# Patient Record
Sex: MALE | Race: AMERICAN INDIAN OR ALASKA NATIVE
[De-identification: names, ages, dates, MRNs, and addresses within clinical notes are randomized per-mention and may not be internally consistent; named-entity substitution may affect disease eponyms.]

---

## 2018-08-17 ENCOUNTER — HOSPITAL ENCOUNTER (EMERGENCY)
Dept: HOSPITAL 5 - ED | Age: 23
Discharge: HOME | End: 2018-08-17
Payer: SELF-PAY

## 2018-08-17 VITALS — SYSTOLIC BLOOD PRESSURE: 124 MMHG | DIASTOLIC BLOOD PRESSURE: 73 MMHG

## 2018-08-17 DIAGNOSIS — Z79.899: ICD-10-CM

## 2018-08-17 DIAGNOSIS — R11.2: Primary | ICD-10-CM

## 2018-08-17 LAB
ALBUMIN SERPL-MCNC: 4.6 G/DL (ref 3.9–5)
ALT SERPL-CCNC: 13 UNITS/L (ref 7–56)
BASOPHILS # (AUTO): 0 K/MM3 (ref 0–0.1)
BASOPHILS NFR BLD AUTO: 0.5 % (ref 0–1.8)
BENZODIAZEPINES SCREEN,URINE: (no result)
BILIRUB UR QL STRIP: (no result)
BLOOD UR QL VISUAL: (no result)
BUN SERPL-MCNC: 19 MG/DL (ref 9–20)
BUN/CREAT SERPL: 19 %
CALCIUM SERPL-MCNC: 9.8 MG/DL (ref 8.4–10.2)
EOSINOPHIL # BLD AUTO: 0 K/MM3 (ref 0–0.4)
EOSINOPHIL NFR BLD AUTO: 0.1 % (ref 0–4.3)
HCT VFR BLD CALC: 40.8 % (ref 35.5–45.6)
HEMOLYSIS INDEX: 24
HGB BLD-MCNC: 13.6 GM/DL (ref 11.8–15.2)
LYMPHOCYTES # BLD AUTO: 2 K/MM3 (ref 1.2–5.4)
LYMPHOCYTES NFR BLD AUTO: 30.3 % (ref 13.4–35)
MCH RBC QN AUTO: 30 PG (ref 28–32)
MCHC RBC AUTO-ENTMCNC: 34 % (ref 32–34)
MCV RBC AUTO: 90 FL (ref 84–94)
METHADONE SCREEN,URINE: (no result)
MONOCYTES # (AUTO): 1 K/MM3 (ref 0–0.8)
MONOCYTES % (AUTO): 14.3 % (ref 0–7.3)
MUCOUS THREADS #/AREA URNS HPF: (no result) /HPF
OPIATE SCREEN,URINE: (no result)
PH UR STRIP: 6 [PH] (ref 5–7)
PLATELET # BLD: 283 K/MM3 (ref 140–440)
RBC # BLD AUTO: 4.51 M/MM3 (ref 3.65–5.03)
RBC #/AREA URNS HPF: 3 /HPF (ref 0–6)
UROBILINOGEN UR-MCNC: 2 MG/DL (ref ?–2)
WBC #/AREA URNS HPF: 1 /HPF (ref 0–6)

## 2018-08-17 PROCEDURE — G0480 DRUG TEST DEF 1-7 CLASSES: HCPCS

## 2018-08-17 PROCEDURE — 96361 HYDRATE IV INFUSION ADD-ON: CPT

## 2018-08-17 PROCEDURE — 96374 THER/PROPH/DIAG INJ IV PUSH: CPT

## 2018-08-17 PROCEDURE — 96375 TX/PRO/DX INJ NEW DRUG ADDON: CPT

## 2018-08-17 PROCEDURE — 99283 EMERGENCY DEPT VISIT LOW MDM: CPT

## 2018-08-17 PROCEDURE — 81001 URINALYSIS AUTO W/SCOPE: CPT

## 2018-08-17 PROCEDURE — 80307 DRUG TEST PRSMV CHEM ANLYZR: CPT

## 2018-08-17 PROCEDURE — 85025 COMPLETE CBC W/AUTO DIFF WBC: CPT

## 2018-08-17 PROCEDURE — 80053 COMPREHEN METABOLIC PANEL: CPT

## 2018-08-17 PROCEDURE — 36415 COLL VENOUS BLD VENIPUNCTURE: CPT

## 2018-08-17 PROCEDURE — 80320 DRUG SCREEN QUANTALCOHOLS: CPT

## 2018-08-17 NOTE — EMERGENCY DEPARTMENT REPORT
Vomiting/Diarrhea





- [facility]


Chief Complaint: Nausea/Vomiting/Diarrhea


Stated Complaint: POSS ALCOHOL POISONING/VOMITING


Time Seen by Provider: 08/17/18 19:25


Duration: 3 Days


Severity: moderate


Nausea/Vomiting Severity: Moderate


Diarrhea Severity: None


Other History: 23-year-old -American male presents to the emergency room 

for complaint of nausea and vomiting and headache feels is related to alcohol 

ingestion.  Patient reports on Tuesday he had 3 drinks tequila and 1 rum since 

then he's had reported bloody vomitus and vomiting since Tuesday body aches and 

headache.





ED Review of Systems


ROS: 


Stated complaint: POSS ALCOHOL POISONING/VOMITING


Other details as noted in HPI








ED Past Medical Hx





- Past Medical History


Previous Medical History?: No


Hx Kidney Stones: No





- Surgical History


Past Surgical History?: No





- Social History


Smoking Status: Never Smoker


Substance Use Type: Alcohol





Vomiting Diarrhea Exam





- Exam


General: 


Vital signs noted. No distress. Alert and acting appropriately.





Neurologic: 


Alert and oriented, no deficits.








Musculoskeletal: 


Unremarkable.











ED Course


 Vital Signs











  08/17/18





  15:39


 


Temperature 99 F


 


Pulse Rate 92 H


 


Respiratory 16





Rate 


 


Blood Pressure 120/77


 


O2 Sat by Pulse 96





Oximetry 














- Reevaluation(s)


Reevaluation #1: 





08/17/18 22:58


Patient has completed 1 L of fluids and reports he feels much better now





ED Medical Decision Making





- Lab Data


Result diagrams: 


 08/17/18 19:44





 08/17/18 19:44


Critical care attestation.: 


If time is entered above; I have spent that time in minutes in the direct care 

of this critically ill patient, excluding procedure time.








ED Disposition


Clinical Impression: 


Nausea and vomiting


Qualifiers:


 Vomiting type: unspecified Vomiting Intractability: intractable Qualified Code(

s): R11.2 - Nausea with vomiting, unspecified





Disposition: DC-01 TO HOME OR SELFCARE


Is pt being admited?: No


Does the pt Need Aspirin: No


Condition: Stable


Instructions:  Gastritis (ED)


Additional Instructions: 


Please continue to drink plenty of fluids such as water and advance her diet as 

tolerated.  If your symptoms persist or gets worse please follow-up with the 

primary care provider.


Referrals: 


PR[facility] CARE,MD [Primary Care Provider] - 3-5 Days


[facility] [Provider Group] - 3-5 Days


Forms:  Work/School Release Form(ED), Accompanied Note

## 2018-09-10 ENCOUNTER — HOSPITAL ENCOUNTER (INPATIENT)
Dept: HOSPITAL 5 - ED | Age: 23
LOS: 4 days | Discharge: HOME | DRG: 393 | End: 2018-09-14
Attending: INTERNAL MEDICINE | Admitting: INTERNAL MEDICINE
Payer: SELF-PAY

## 2018-09-10 DIAGNOSIS — Z82.49: ICD-10-CM

## 2018-09-10 DIAGNOSIS — B20: ICD-10-CM

## 2018-09-10 DIAGNOSIS — R65.10: ICD-10-CM

## 2018-09-10 DIAGNOSIS — K38.8: ICD-10-CM

## 2018-09-10 DIAGNOSIS — K60.2: Primary | ICD-10-CM

## 2018-09-10 LAB
ALBUMIN SERPL-MCNC: 4.4 G/DL (ref 3.9–5)
ALT SERPL-CCNC: 13 UNITS/L (ref 7–56)
APTT BLD: 35.1 SEC. (ref 24.2–36.6)
BASOPHILS # (AUTO): 0 K/MM3 (ref 0–0.1)
BASOPHILS NFR BLD AUTO: 0.1 % (ref 0–1.8)
BILIRUB UR QL STRIP: (no result)
BLOOD UR QL VISUAL: (no result)
BUN SERPL-MCNC: 9 MG/DL (ref 9–20)
BUN/CREAT SERPL: 8 %
CALCIUM SERPL-MCNC: 9.5 MG/DL (ref 8.4–10.2)
EOSINOPHIL # BLD AUTO: 0 K/MM3 (ref 0–0.4)
EOSINOPHIL NFR BLD AUTO: 0 % (ref 0–4.3)
HCT VFR BLD CALC: 40.7 % (ref 35.5–45.6)
HEMOLYSIS INDEX: 3
HGB BLD-MCNC: 13.7 GM/DL (ref 11.8–15.2)
INR PPP: 1.15 (ref 0.87–1.13)
INR PPP: 1.15 (ref 0.87–1.13)
LIPASE SERPL-CCNC: 14 UNITS/L (ref 13–60)
LYMPHOCYTES # BLD AUTO: 2.2 K/MM3 (ref 1.2–5.4)
LYMPHOCYTES NFR BLD AUTO: 21.1 % (ref 13.4–35)
MCH RBC QN AUTO: 31 PG (ref 28–32)
MCHC RBC AUTO-ENTMCNC: 34 % (ref 32–34)
MCV RBC AUTO: 91 FL (ref 84–94)
MONOCYTES # (AUTO): 1.3 K/MM3 (ref 0–0.8)
MONOCYTES % (AUTO): 12.8 % (ref 0–7.3)
MUCOUS THREADS #/AREA URNS HPF: (no result) /HPF
PH UR STRIP: 5 [PH] (ref 5–7)
PLATELET # BLD: 278 K/MM3 (ref 140–440)
PROT UR STRIP-MCNC: (no result) MG/DL
RBC # BLD AUTO: 4.47 M/MM3 (ref 3.65–5.03)
RBC #/AREA URNS HPF: 50 /HPF (ref 0–6)
UROBILINOGEN UR-MCNC: < 2 MG/DL (ref ?–2)
WBC #/AREA URNS HPF: 2 /HPF (ref 0–6)

## 2018-09-10 PROCEDURE — 80048 BASIC METABOLIC PNL TOTAL CA: CPT

## 2018-09-10 PROCEDURE — 85014 HEMATOCRIT: CPT

## 2018-09-10 PROCEDURE — 36415 COLL VENOUS BLD VENIPUNCTURE: CPT

## 2018-09-10 PROCEDURE — 87040 BLOOD CULTURE FOR BACTERIA: CPT

## 2018-09-10 PROCEDURE — 74177 CT ABD & PELVIS W/CONTRAST: CPT

## 2018-09-10 PROCEDURE — 85018 HEMOGLOBIN: CPT

## 2018-09-10 PROCEDURE — 87086 URINE CULTURE/COLONY COUNT: CPT

## 2018-09-10 PROCEDURE — 86900 BLOOD TYPING SEROLOGIC ABO: CPT

## 2018-09-10 PROCEDURE — 83735 ASSAY OF MAGNESIUM: CPT

## 2018-09-10 PROCEDURE — 80053 COMPREHEN METABOLIC PANEL: CPT

## 2018-09-10 PROCEDURE — 72195 MRI PELVIS W/O DYE: CPT

## 2018-09-10 PROCEDURE — 85025 COMPLETE CBC W/AUTO DIFF WBC: CPT

## 2018-09-10 PROCEDURE — 96375 TX/PRO/DX INJ NEW DRUG ADDON: CPT

## 2018-09-10 PROCEDURE — 82805 BLOOD GASES W/O2 SATURATION: CPT

## 2018-09-10 PROCEDURE — 87430 STREP A AG IA: CPT

## 2018-09-10 PROCEDURE — 86695 HERPES SIMPLEX TYPE 1 TEST: CPT

## 2018-09-10 PROCEDURE — 93005 ELECTROCARDIOGRAM TRACING: CPT

## 2018-09-10 PROCEDURE — 86696 HERPES SIMPLEX TYPE 2 TEST: CPT

## 2018-09-10 PROCEDURE — 86140 C-REACTIVE PROTEIN: CPT

## 2018-09-10 PROCEDURE — 83690 ASSAY OF LIPASE: CPT

## 2018-09-10 PROCEDURE — 87591 N.GONORRHOEAE DNA AMP PROB: CPT

## 2018-09-10 PROCEDURE — 82140 ASSAY OF AMMONIA: CPT

## 2018-09-10 PROCEDURE — 86850 RBC ANTIBODY SCREEN: CPT

## 2018-09-10 PROCEDURE — 96365 THER/PROPH/DIAG IV INF INIT: CPT

## 2018-09-10 PROCEDURE — 85007 BL SMEAR W/DIFF WBC COUNT: CPT

## 2018-09-10 PROCEDURE — 86592 SYPHILIS TEST NON-TREP QUAL: CPT

## 2018-09-10 PROCEDURE — 85730 THROMBOPLASTIN TIME PARTIAL: CPT

## 2018-09-10 PROCEDURE — 93010 ELECTROCARDIOGRAM REPORT: CPT

## 2018-09-10 PROCEDURE — 96361 HYDRATE IV INFUSION ADD-ON: CPT

## 2018-09-10 PROCEDURE — 87116 MYCOBACTERIA CULTURE: CPT

## 2018-09-10 PROCEDURE — 71045 X-RAY EXAM CHEST 1 VIEW: CPT

## 2018-09-10 PROCEDURE — 86901 BLOOD TYPING SEROLOGIC RH(D): CPT

## 2018-09-10 PROCEDURE — 87400 INFLUENZA A/B EACH AG IA: CPT

## 2018-09-10 PROCEDURE — 85610 PROTHROMBIN TIME: CPT

## 2018-09-10 PROCEDURE — 81001 URINALYSIS AUTO W/SCOPE: CPT

## 2018-09-10 PROCEDURE — 82271 OCCULT BLOOD OTHER SOURCES: CPT

## 2018-09-10 NOTE — XRAY REPORT
FINAL REPORT



EXAM:  XR CHEST 1V AP



HISTORY:  sepsis 



TECHNIQUE:  AP portable view of the chest



PRIORS:  None.



FINDINGS:  

Lines, tubes, and devices:  N/A



Lungs and pleura: Trachea is normal in position. Lungs are clear

of infiltrate, pleural effusion, vascular congestion, or

pneumothorax. 



Cardiomediastinal silhouette: Cardiac and mediastinal silhouettes

are unremarkable.



Other: Bony structures are intact.



IMPRESSION:  

No acute cardiopulmonary process seen.

## 2018-09-10 NOTE — EMERGENCY DEPARTMENT REPORT
ED GI Bleed HPI





- General


Chief complaint: GI Bleed


Stated complaint: BODY PAINS/BLOOD IN STOOL


Time Seen by Provider: 09/10/18 16:59


Source: patient


Mode of arrival: Ambulatory


Limitations: No Limitations





- History of Present Illness


Initial comments: 





23-year-old male with a past medical history of HIV presents to Hospital 

complaining of rectal pain and blood on his tissue when he wipes after bowel 

movements for the past one week.  Last night he developed generalized body 

aches and chills and presents to the ER with 101.1 fever.  Patient states his 

CD4 count 2 months ago was 690.  He is taking HIV medication.  He engages in 

receptive anal intercourse was last intercourse about one week ago.  He 

complains of sore throat.  He denies cough, chest pain, shortness of breath, 

abdominal pain, nausea, vomiting, diarrhea, or melena.  Patient received 

Tylenol prior to my evaluation


Severity scale (0 -10): 6





- Related Data


 Allergies











Allergy/AdvReac Type Severity Reaction Status Date / Time


 


No Known Allergies Allergy   Verified 09/10/18 14:53














ED Review of Systems


ROS: 


Stated complaint: BODY PAINS/BLOOD IN STOOL


Other details as noted in HPI





Comment: All other systems reviewed and negative





ED Past Medical Hx





- Past Medical History


Hx Kidney Stones: No


Additional medical history: HIV





- Surgical History


Past Surgical History?: No





- Social History


Smoking Status: Never Smoker


Substance Use Type: Alcohol





ED Physical Exam





- General


Limitations: No Limitations





- Other


Other exam information: 





General: No limitations, patient is alert in no acute distress


Head exam: Atraumatic, normocephalic


Eyes exam: Normal appearance


ENT: Moist mucous membrane, normal oropharynx, no exudates, no thrush


Neck exam: Normal inspection, full range of motion, no meningismus nontender


Respiratory exam: Clear to auscultation bilateral, no wheezes, rales, crackles


Cardiovascular: Normal rate and rhythm, normal heart sounds


Abdomen: Soft, nondistended, left lower quadrant tenderness, with normal bowel 

sounds, no rebound, or guarding


Rectal: Anal skin tenderness versus wart.  Guaiac positive brown stool without 

gross blood or melena.  No fluctuance and rectal.  Or visible abscess


Extremity: Full range of motion normal inspection no deformity


Back: Normal Inspection, full range of motion, no tenderness


Neurologic: Alert, oriented x3, cranial nerves intact, no motor or sensory 

deficit


Psychiatric: normal affect, normal mood


Skin: Warm, dry, intact





ED Course


 Vital Signs











  09/10/18 09/10/18 09/10/18





  14:54 16:01 16:56


 


Temperature 101.1 F H  


 


Pulse Rate 116 H  


 


Respiratory 18 16 





Rate   


 


Blood Pressure 109/73  


 


Blood Pressure   





[Right]   


 


O2 Sat by Pulse 99  99





Oximetry   














  09/10/18 09/10/18 09/10/18





  17:00 17:09 18:30


 


Temperature   


 


Pulse Rate 104 H  


 


Respiratory 19 16 16





Rate   


 


Blood Pressure 110/78  


 


Blood Pressure   





[Right]   


 


O2 Sat by Pulse 99 100 





Oximetry   














  09/10/18 09/10/18 09/10/18





  19:26 20:00 21:00


 


Temperature 98.2 F  


 


Pulse Rate 111 H 99 H 102 H


 


Respiratory 17 15 19





Rate   


 


Blood Pressure  107/75 109/69


 


Blood Pressure 105/67  





[Right]   


 


O2 Sat by Pulse 97  100





Oximetry   














- Consultations


Consultation #1: 





09/10/18 22:05


Case discussed with Dr. Xavier general surgeon on call.  Will see patient in 

consult and recommends admission to the hospitalist.





ED Medical Decision Making





- Lab Data


Result diagrams: 


 09/10/18 15:07





 09/10/18 15:07








 Lab Results











  09/10/18 09/10/18 09/10/18 Range/Units





  15:07 15:07 15:07 


 


WBC  10.2    (4.5-11.0)  K/mm3


 


RBC  4.47    (3.65-5.03)  M/mm3


 


Hgb  13.7    (11.8-15.2)  gm/dl


 


Hct  40.7    (35.5-45.6)  %


 


MCV  91    (84-94)  fl


 


MCH  31    (28-32)  pg


 


MCHC  34    (32-34)  %


 


RDW  15.5 H    (13.2-15.2)  %


 


Plt Count  278    (140-440)  K/mm3


 


Lymph % (Auto)  21.1    (13.4-35.0)  %


 


Mono % (Auto)  12.8 H    (0.0-7.3)  %


 


Eos % (Auto)  0.0    (0.0-4.3)  %


 


Baso % (Auto)  0.1    (0.0-1.8)  %


 


Lymph #  2.2    (1.2-5.4)  K/mm3


 


Mono #  1.3 H    (0.0-0.8)  K/mm3


 


Eos #  0.0    (0.0-0.4)  K/mm3


 


Baso #  0.0    (0.0-0.1)  K/mm3


 


Seg Neutrophils %  66.0    (40.0-70.0)  %


 


Seg Neutrophils #  6.8    (1.8-7.7)  K/mm3


 


PT   15.3 H   (12.2-14.9)  Sec.


 


INR   1.15 H   (0.87-1.13)  


 


APTT   35.1   (24.2-36.6)  Sec.


 


VBG pH     (7.320-7.420)  


 


Sodium    137  (137-145)  mmol/L


 


Potassium    4.5  (3.6-5.0)  mmol/L


 


Chloride    99.7  ()  mmol/L


 


Carbon Dioxide    26  (22-30)  mmol/L


 


Anion Gap    16  mmol/L


 


BUN    9  (9-20)  mg/dL


 


Creatinine    1.1  (0.8-1.5)  mg/dL


 


Estimated GFR    > 60  ml/min


 


BUN/Creatinine Ratio    8  %


 


Glucose    104 H  ()  mg/dL


 


Lactic Acid     (0.7-2.0)  mmol/L


 


Calcium    9.5  (8.4-10.2)  mg/dL


 


Magnesium     (1.7-2.3)  mg/dL


 


Total Bilirubin    0.60  (0.1-1.2)  mg/dL


 


AST    24  (5-40)  units/L


 


ALT    13  (7-56)  units/L


 


Alkaline Phosphatase    75  ()  units/L


 


Total Protein    9.2 H  (6.3-8.2)  g/dL


 


Albumin    4.4  (3.9-5)  g/dL


 


Albumin/Globulin Ratio    0.9  %


 


Lipase    14  (13-60)  units/L


 


Urine Color     (Yellow)  


 


Urine Turbidity     (Clear)  


 


Urine pH     (5.0-7.0)  


 


Ur Specific Gravity     (1.003-1.030)  


 


Urine Protein     (Negative)  mg/dL


 


Urine Glucose (UA)     (Negative)  mg/dL


 


Urine Ketones     (Negative)  mg/dL


 


Urine Blood     (Negative)  


 


Urine Nitrite     (Negative)  


 


Urine Bilirubin     (Negative)  


 


Urine Urobilinogen     (<2.0)  mg/dL


 


Ur Leukocyte Esterase     (Negative)  


 


Urine WBC (Auto)     (0.0-6.0)  /HPF


 


Urine RBC (Auto)     (0.0-6.0)  /HPF


 


U Epithel Cells (Auto)     (0-13.0)  /HPF


 


Urine Mucus     /HPF


 


Blood Type     


 


Antibody Screen     














  09/10/18 09/10/18 09/10/18 Range/Units





  15:07 15:07 16:20 


 


WBC     (4.5-11.0)  K/mm3


 


RBC     (3.65-5.03)  M/mm3


 


Hgb     (11.8-15.2)  gm/dl


 


Hct     (35.5-45.6)  %


 


MCV     (84-94)  fl


 


MCH     (28-32)  pg


 


MCHC     (32-34)  %


 


RDW     (13.2-15.2)  %


 


Plt Count     (140-440)  K/mm3


 


Lymph % (Auto)     (13.4-35.0)  %


 


Mono % (Auto)     (0.0-7.3)  %


 


Eos % (Auto)     (0.0-4.3)  %


 


Baso % (Auto)     (0.0-1.8)  %


 


Lymph #     (1.2-5.4)  K/mm3


 


Mono #     (0.0-0.8)  K/mm3


 


Eos #     (0.0-0.4)  K/mm3


 


Baso #     (0.0-0.1)  K/mm3


 


Seg Neutrophils %     (40.0-70.0)  %


 


Seg Neutrophils #     (1.8-7.7)  K/mm3


 


PT     (12.2-14.9)  Sec.


 


INR     (0.87-1.13)  


 


APTT     (24.2-36.6)  Sec.


 


VBG pH     (7.320-7.420)  


 


Sodium     (137-145)  mmol/L


 


Potassium     (3.6-5.0)  mmol/L


 


Chloride     ()  mmol/L


 


Carbon Dioxide     (22-30)  mmol/L


 


Anion Gap     mmol/L


 


BUN     (9-20)  mg/dL


 


Creatinine     (0.8-1.5)  mg/dL


 


Estimated GFR     ml/min


 


BUN/Creatinine Ratio     %


 


Glucose     ()  mg/dL


 


Lactic Acid   1.00   (0.7-2.0)  mmol/L


 


Calcium     (8.4-10.2)  mg/dL


 


Magnesium    1.90  (1.7-2.3)  mg/dL


 


Total Bilirubin     (0.1-1.2)  mg/dL


 


AST     (5-40)  units/L


 


ALT     (7-56)  units/L


 


Alkaline Phosphatase     ()  units/L


 


Total Protein     (6.3-8.2)  g/dL


 


Albumin     (3.9-5)  g/dL


 


Albumin/Globulin Ratio     %


 


Lipase     (13-60)  units/L


 


Urine Color     (Yellow)  


 


Urine Turbidity     (Clear)  


 


Urine pH     (5.0-7.0)  


 


Ur Specific Gravity     (1.003-1.030)  


 


Urine Protein     (Negative)  mg/dL


 


Urine Glucose (UA)     (Negative)  mg/dL


 


Urine Ketones     (Negative)  mg/dL


 


Urine Blood     (Negative)  


 


Urine Nitrite     (Negative)  


 


Urine Bilirubin     (Negative)  


 


Urine Urobilinogen     (<2.0)  mg/dL


 


Ur Leukocyte Esterase     (Negative)  


 


Urine WBC (Auto)     (0.0-6.0)  /HPF


 


Urine RBC (Auto)     (0.0-6.0)  /HPF


 


U Epithel Cells (Auto)     (0-13.0)  /HPF


 


Urine Mucus     /HPF


 


Blood Type  O NEGATIVE    


 


Antibody Screen  Negative    














  09/10/18 09/10/18 09/10/18 Range/Units





  16:20 16:20 16:20 


 


WBC     (4.5-11.0)  K/mm3


 


RBC     (3.65-5.03)  M/mm3


 


Hgb     (11.8-15.2)  gm/dl


 


Hct     (35.5-45.6)  %


 


MCV     (84-94)  fl


 


MCH     (28-32)  pg


 


MCHC     (32-34)  %


 


RDW     (13.2-15.2)  %


 


Plt Count     (140-440)  K/mm3


 


Lymph % (Auto)     (13.4-35.0)  %


 


Mono % (Auto)     (0.0-7.3)  %


 


Eos % (Auto)     (0.0-4.3)  %


 


Baso % (Auto)     (0.0-1.8)  %


 


Lymph #     (1.2-5.4)  K/mm3


 


Mono #     (0.0-0.8)  K/mm3


 


Eos #     (0.0-0.4)  K/mm3


 


Baso #     (0.0-0.1)  K/mm3


 


Seg Neutrophils %     (40.0-70.0)  %


 


Seg Neutrophils #     (1.8-7.7)  K/mm3


 


PT  15.3 H    (12.2-14.9)  Sec.


 


INR  1.15 H    (0.87-1.13)  


 


APTT     (24.2-36.6)  Sec.


 


VBG pH    7.340  (7.320-7.420)  


 


Sodium     (137-145)  mmol/L


 


Potassium     (3.6-5.0)  mmol/L


 


Chloride     ()  mmol/L


 


Carbon Dioxide     (22-30)  mmol/L


 


Anion Gap     mmol/L


 


BUN     (9-20)  mg/dL


 


Creatinine     (0.8-1.5)  mg/dL


 


Estimated GFR     ml/min


 


BUN/Creatinine Ratio     %


 


Glucose     ()  mg/dL


 


Lactic Acid   1.20   (0.7-2.0)  mmol/L


 


Calcium     (8.4-10.2)  mg/dL


 


Magnesium     (1.7-2.3)  mg/dL


 


Total Bilirubin     (0.1-1.2)  mg/dL


 


AST     (5-40)  units/L


 


ALT     (7-56)  units/L


 


Alkaline Phosphatase     ()  units/L


 


Total Protein     (6.3-8.2)  g/dL


 


Albumin     (3.9-5)  g/dL


 


Albumin/Globulin Ratio     %


 


Lipase     (13-60)  units/L


 


Urine Color     (Yellow)  


 


Urine Turbidity     (Clear)  


 


Urine pH     (5.0-7.0)  


 


Ur Specific Gravity     (1.003-1.030)  


 


Urine Protein     (Negative)  mg/dL


 


Urine Glucose (UA)     (Negative)  mg/dL


 


Urine Ketones     (Negative)  mg/dL


 


Urine Blood     (Negative)  


 


Urine Nitrite     (Negative)  


 


Urine Bilirubin     (Negative)  


 


Urine Urobilinogen     (<2.0)  mg/dL


 


Ur Leukocyte Esterase     (Negative)  


 


Urine WBC (Auto)     (0.0-6.0)  /HPF


 


Urine RBC (Auto)     (0.0-6.0)  /HPF


 


U Epithel Cells (Auto)     (0-13.0)  /HPF


 


Urine Mucus     /HPF


 


Blood Type     


 


Antibody Screen     














  09/10/18 09/10/18 Range/Units





  19:45 20:02 


 


WBC    (4.5-11.0)  K/mm3


 


RBC    (3.65-5.03)  M/mm3


 


Hgb    (11.8-15.2)  gm/dl


 


Hct    (35.5-45.6)  %


 


MCV    (84-94)  fl


 


MCH    (28-32)  pg


 


MCHC    (32-34)  %


 


RDW    (13.2-15.2)  %


 


Plt Count    (140-440)  K/mm3


 


Lymph % (Auto)    (13.4-35.0)  %


 


Mono % (Auto)    (0.0-7.3)  %


 


Eos % (Auto)    (0.0-4.3)  %


 


Baso % (Auto)    (0.0-1.8)  %


 


Lymph #    (1.2-5.4)  K/mm3


 


Mono #    (0.0-0.8)  K/mm3


 


Eos #    (0.0-0.4)  K/mm3


 


Baso #    (0.0-0.1)  K/mm3


 


Seg Neutrophils %    (40.0-70.0)  %


 


Seg Neutrophils #    (1.8-7.7)  K/mm3


 


PT    (12.2-14.9)  Sec.


 


INR    (0.87-1.13)  


 


APTT    (24.2-36.6)  Sec.


 


VBG pH    (7.320-7.420)  


 


Sodium    (137-145)  mmol/L


 


Potassium    (3.6-5.0)  mmol/L


 


Chloride    ()  mmol/L


 


Carbon Dioxide    (22-30)  mmol/L


 


Anion Gap    mmol/L


 


BUN    (9-20)  mg/dL


 


Creatinine    (0.8-1.5)  mg/dL


 


Estimated GFR    ml/min


 


BUN/Creatinine Ratio    %


 


Glucose    ()  mg/dL


 


Lactic Acid   1.40  (0.7-2.0)  mmol/L


 


Calcium    (8.4-10.2)  mg/dL


 


Magnesium    (1.7-2.3)  mg/dL


 


Total Bilirubin    (0.1-1.2)  mg/dL


 


AST    (5-40)  units/L


 


ALT    (7-56)  units/L


 


Alkaline Phosphatase    ()  units/L


 


Total Protein    (6.3-8.2)  g/dL


 


Albumin    (3.9-5)  g/dL


 


Albumin/Globulin Ratio    %


 


Lipase    (13-60)  units/L


 


Urine Color  Yellow   (Yellow)  


 


Urine Turbidity  Clear   (Clear)  


 


Urine pH  5.0   (5.0-7.0)  


 


Ur Specific Gravity  1.026   (1.003-1.030)  


 


Urine Protein  <15 mg/dl   (Negative)  mg/dL


 


Urine Glucose (UA)  Neg   (Negative)  mg/dL


 


Urine Ketones  Neg   (Negative)  mg/dL


 


Urine Blood  Mod   (Negative)  


 


Urine Nitrite  Neg   (Negative)  


 


Urine Bilirubin  Neg   (Negative)  


 


Urine Urobilinogen  < 2.0   (<2.0)  mg/dL


 


Ur Leukocyte Esterase  Neg   (Negative)  


 


Urine WBC (Auto)  2.0   (0.0-6.0)  /HPF


 


Urine RBC (Auto)  50.0   (0.0-6.0)  /HPF


 


U Epithel Cells (Auto)  1.0   (0-13.0)  /HPF


 


Urine Mucus  1+   /HPF


 


Blood Type    


 


Antibody Screen    














- EKG Data


-: EKG Interpreted by Me


EKG shows normal: sinus rhythm, axis (qrs 24), QRS complexes (qrsd 83), ST-T 

waves (no stemi/t inv)


Rate: tachycardia (112)





- EKG Data


When compared to previous EKG there are: previous EKG unavailable





- Medical Decision Making





Rectal bleeding after bowel movement


No gross blood on rectal exam although guaiac positive


H&H normal





Fever with rectal pain


Empirically covered with Zosyn


30 ml/kg bolus of normal saline as per sepsis protocol


CT abdomen and pelvis: Borderline findings for acute appendicitis.  Patient 

reexamined after results obtained and persistently has left lower quadrant pain 

without right lower quadrant tenderness.


Case discussed with surgeon on call and patient will be admitted to hospitalist 

with surgical consultation and monitoring





UA shows moderate blood but no signs of infection





- Differential Diagnosis


rectal/perirectal abscess, diverticulitis, proctitis


Critical Care Time: No


Critical care attestation.: 


If time is entered above; I have spent that time in minutes in the direct care 

of this critically ill patient, excluding procedure time.








ED Disposition


Clinical Impression: 


 Fever, Rectal bleeding, Left lower quadrant pain, HIV positive





Disposition: DC-09 OP ADMIT IP TO THIS HOSP


Is pt being admited?: Yes


Condition: Stable


Time of Disposition: 22:08 (Dr. Lao/Hospitalist)

## 2018-09-10 NOTE — CAT SCAN REPORT
FINAL REPORT



EXAM:  CT ABDOMEN PELVIS W CON



HISTORY:  rectal pain, fever, LLQ pain 



TECHNIQUE:  Standard enhanced CT of the abdomen and pelvis.

Coronal and sagittal reconstruction was also performed. Delayed

imaging through the kidneys and bladder was also obtained. 



Contrast:  100 mL Omnipaque 300 given IV. Oral contrast was given



PRIORS:  None.



FINDINGS:  

The appendix measures 8 mm in diameter and has mild diffuse wall

thickening. It does not fill with oral contrast on delayed

imaging. The appendix dips down into the right side of the

pelvis. However, no surrounding inflammation is seen in the

adjacent fat. No evidence for abscess is seen. These findings are

borderline for acute appendicitis and should be correlated

clinically. (Axial image 67, series 4, coronal image 78). 



The rectum appears normal. 



Within the abdomen, the liver, spleen, pancreas, gallbladder,

adrenal glands, and kidneys are unremarkable. No evidence for

retroperitoneal or pelvic lymphadenopathy is seen. There is

moderate stool present throughout the entire colon. The bowel

loops have normal caliber. No soft tissue mass, fluid collection,

inflammatory change, or free air is seen within the abdomen or

pelvis. 



Within the pelvis, the bladder is unremarkable. The prostate is

normal. No evidence for mass or lymphadenopathy is seen in the

pelvis. 



Images through the upper abdomen include the lung bases which are

expanded and clear.



Bony structures show no focal abnormalities and are intact.



IMPRESSION:  

1. Normal appearance to the rectum. 



2. The appendix is mildly dilated in size with wall thickening

but no surrounding inflammation. It does not fill with oral

contrast. These findings can be consistent with acute

appendicitis. The appendix dips into the right pelvis.

## 2018-09-10 NOTE — HISTORY AND PHYSICAL REPORT
History of Present Illness


Date of examination: 09/10/18


History of present illness: 


32-year-old man with a history HIV, CD4 count of 690 g the emergency room with 

complaint of abdominal pain located in the left lower quadrant which she 

described as sharp, intermittent, lasting for a few seconds, intensity5/10, no 

radiation, he cannot identify exacerbating factor.  Admits to nausea no 

vomiting.  He's been having blood per rectum only with bowel movements, once a 

day over the last 1 week.  Today he complains of fever and chills.  Stated he 

had blood per rectum 5 years ago when he was diagnosed with HIV


Review of systems


Constitutional: no  weight loss, chills, fever


Ears, eyes, nose, mouth and throat: no nasal congestion, no nasal discharge, no 

sinus pressure, no vision change, no red eye.


Neck: No neck pain or rigidity.


Cardiovascular: no chest pain, palpitations


Respiratory: no cough, shortness of breath


Gastrointestinal: + abdominal pain hematochezia


Genitourinary : no  frequency , no hematuria


Musculoskeletal: no joint swelling or muscle ache 


Integumentary: no rash, no pruritis


Neurological: no parathesias, no numbness, no focal weakness


Endocrine: no cold or heat intolerance, no polyuria or polydipsia


Hematologic/Lymphatic: no easy bruising, no easy bleeding, no gland swelling


Allergic/Immunologic: no urticaria, no angioedema.





PAST MEDICAL HISTORY: HIV





PAST SURGICAL HISTORY: None





SOCIAL HISTORY: No alcohol, no drugs, tobacco





FAMILY HISTORY: Hypertension








Medications and Allergies


 Allergies











Allergy/AdvReac Type Severity Reaction Status Date / Time


 


No Known Allergies Allergy   Verified 09/10/18 14:53











 Home Medications











 Medication  Instructions  Recorded  Confirmed  Last Taken  Type


 


Emtricita/Rilpivirine/Tenof Df 1 each PO DAILY 09/10/18 09/10/18 Unknown History





[Complera Tablet]     











Active Meds: 


Active Medications





Piperacillin Sod/Tazobactam Sod (Zosyn/Ns 4.5gm/100ml)  4.5 gm in 100 mls @ 200 

mls/hr IV ONCE ELIAS


   Last Admin: 09/10/18 18:00 Dose:  200 mls/hr











Exam





- Physical Exam


Narrative exam: 


Gen. appearance: Patient lying in bed, no apparent distress


HEENT: Normocephalic, atraumatic, pupils equally round and reactive to light,  

extraocular movement intact, and no sclericterus,. No JVD or thyromegaly or 

nodule,neck supple, no carotid bruit ,mucous membranes moist, no exudate or 

erythema


Heart: S1, S2, regular rate and rhythm


Lungs: Clear bilaterally, breathing comfortable


Abdomen: Positive bowel sounds, tender left lower quadrant, nondistended, no 

organomegaly


Extremity:no edema cyanosis, clubbing


Skin:  no rash, dry, warm


Neuro: Oriented 3, cranial nerves II-12 intact, speech is fluent, motor and 

sensory intact


Rectal: Brown stool, heme positive











- Constitutional


Vitals: 


 











Temp Pulse Resp BP Pulse Ox


 


 98.2 F   102 H  19   109/69   100 


 


 09/10/18 19:26  09/10/18 21:00  09/10/18 21:00  09/10/18 21:00  09/10/18 21:00














Results





- Labs


CBC & Chem 7: 


 09/10/18 15:07





 09/10/18 15:07


Labs: 


 Abnormal lab results











  09/10/18 09/10/18 09/10/18 Range/Units





  15:07 15:07 15:07 


 


RDW  15.5 H    (13.2-15.2)  %


 


Mono % (Auto)  12.8 H    (0.0-7.3)  %


 


Mono #  1.3 H    (0.0-0.8)  K/mm3


 


PT   15.3 H   (12.2-14.9)  Sec.


 


INR   1.15 H   (0.87-1.13)  


 


Glucose    104 H  ()  mg/dL


 


Total Protein    9.2 H  (6.3-8.2)  g/dL














  09/10/18 Range/Units





  16:20 


 


RDW   (13.2-15.2)  %


 


Mono % (Auto)   (0.0-7.3)  %


 


Mono #   (0.0-0.8)  K/mm3


 


PT  15.3 H  (12.2-14.9)  Sec.


 


INR  1.15 H  (0.87-1.13)  


 


Glucose   ()  mg/dL


 


Total Protein   (6.3-8.2)  g/dL














- Imaging and Cardiology


Chest x-ray: image reviewed


CT scan - abdomen: report reviewed


CT scan - pelvis: report reviewed





Assessment and Plan


Assessment


Abdominal pain


Possible appendicitis


Blood per rectum


SIRS





Plan


Admit to medicine


Start IV fluid, IV Zosyn, follow cultures


Check serial hemoglobin


Consults surgery, GI, IV morphine


DVT prophylaxis

## 2018-09-11 LAB
BASOPHILS # (AUTO): 0 K/MM3 (ref 0–0.1)
BASOPHILS NFR BLD AUTO: 0.3 % (ref 0–1.8)
BUN SERPL-MCNC: 8 MG/DL (ref 9–20)
BUN/CREAT SERPL: 8 %
CALCIUM SERPL-MCNC: 8.5 MG/DL (ref 8.4–10.2)
EOSINOPHIL # BLD AUTO: 0 K/MM3 (ref 0–0.4)
EOSINOPHIL NFR BLD AUTO: 0.1 % (ref 0–4.3)
HCT VFR BLD CALC: 35.1 % (ref 35.5–45.6)
HCT VFR BLD CALC: 35.9 % (ref 35.5–45.6)
HCT VFR BLD CALC: 36.6 % (ref 35.5–45.6)
HEMOLYSIS INDEX: 4
HGB BLD-MCNC: 11.9 GM/DL (ref 11.8–15.2)
HGB BLD-MCNC: 12.1 GM/DL (ref 11.8–15.2)
HGB BLD-MCNC: 12.3 GM/DL (ref 11.8–15.2)
LYMPHOCYTES # BLD AUTO: 1.3 K/MM3 (ref 1.2–5.4)
LYMPHOCYTES NFR BLD AUTO: 16.5 % (ref 13.4–35)
MCH RBC QN AUTO: 31 PG (ref 28–32)
MCHC RBC AUTO-ENTMCNC: 34 % (ref 32–34)
MCV RBC AUTO: 92 FL (ref 84–94)
MONOCYTES # (AUTO): 1.1 K/MM3 (ref 0–0.8)
MONOCYTES % (AUTO): 13.7 % (ref 0–7.3)
PLATELET # BLD: 207 K/MM3 (ref 140–440)
RBC # BLD AUTO: 3.99 M/MM3 (ref 3.65–5.03)

## 2018-09-11 RX ADMIN — PIPERACILLIN AND TAZOBACTAM SCH MLS/HR: 4; .5 INJECTION, POWDER, FOR SOLUTION INTRAVENOUS at 09:49

## 2018-09-11 RX ADMIN — METRONIDAZOLE SCH MLS/HR: 5 INJECTION, SOLUTION INTRAVENOUS at 23:42

## 2018-09-11 RX ADMIN — ACETAMINOPHEN PRN MG: 325 TABLET ORAL at 15:36

## 2018-09-11 RX ADMIN — SODIUM CHLORIDE SCH MLS/HR: 0.9 INJECTION, SOLUTION INTRAVENOUS at 23:48

## 2018-09-11 RX ADMIN — ACETAMINOPHEN PRN MG: 325 TABLET ORAL at 23:51

## 2018-09-11 RX ADMIN — SODIUM CHLORIDE SCH MLS/HR: 0.9 INJECTION, SOLUTION INTRAVENOUS at 15:40

## 2018-09-11 RX ADMIN — CEFTRIAXONE SODIUM SCH MLS/HR: 2 INJECTION, POWDER, FOR SOLUTION INTRAMUSCULAR; INTRAVENOUS at 21:21

## 2018-09-11 RX ADMIN — Medication SCH ML: at 09:45

## 2018-09-11 RX ADMIN — DOXYCYCLINE SCH MLS/HR: 100 INJECTION, POWDER, LYOPHILIZED, FOR SOLUTION INTRAVENOUS at 21:24

## 2018-09-11 RX ADMIN — PIPERACILLIN AND TAZOBACTAM SCH MLS/HR: 4; .5 INJECTION, POWDER, FOR SOLUTION INTRAVENOUS at 02:46

## 2018-09-11 RX ADMIN — SODIUM CHLORIDE SCH MLS/HR: 0.9 INJECTION, SOLUTION INTRAVENOUS at 02:47

## 2018-09-11 RX ADMIN — PIPERACILLIN AND TAZOBACTAM SCH MLS/HR: 4; .5 INJECTION, POWDER, FOR SOLUTION INTRAVENOUS at 17:49

## 2018-09-11 RX ADMIN — Medication SCH ML: at 23:43

## 2018-09-11 NOTE — PROGRESS NOTE
Assessment and Plan











Full consult dictated





22 y/o male c/o night sweats,  sore throat, fever and headache.  also c/o 

rectal bleeding. no rectal pain.  HIV +  active rectal intercourse.





Abd soft, non tender.  deferred rectal exam at this time since pt has no rectal 

pain and has already had two rectal exams.





CT - reviewed with radiologist.  no obvious intra abd source of infection.





rec GI eval for colonoscopy.   





will follow with you








32-year-old man with a history HIV, CD4 count of 690 g the emergency room with 

complaint of abdominal pain located in the left lower quadrant which she 

described as sharp, intermittent, lasting for a few seconds, intensity5/10, no 

radiation, he cannot identify exacerbating factor.  Admits to nausea no 

vomiting.  He's been having blood per rectum only with bowel movements, once a 

day over the last 1 week.  Today he complains of fever and chills.  Stated he 

had blood per rectum 5 years ago when he was diagnosed with HIV








 Selected Entries











  09/11/18





  12:19


 


Temperature 99.2 F


 


Pulse Rate 118 H


 


Respiratory 20





Rate 


 


Blood Pressure 91/50








 Laboratory Tests











  09/11/18 09/11/18 09/11/18





  02:31 04:36 09:23


 


WBC  7.8  


 


Hgb  12.3  11.9  12.1


 


Hct  36.6  35.1 L  35.9














Objective


 Vital Signs - 12hr











  09/11/18





  12:19


 


Temperature 99.2 F


 


Pulse Rate 118 H


 


Respiratory 20





Rate 


 


Blood Pressure 91/50


 


O2 Sat by Pulse 96





Oximetry 














- Labs





 09/11/18 09:23





 09/11/18 02:31


 Diabetes panel











  09/11/18 Range/Units





  02:31 


 


Sodium  139  (137-145)  mmol/L


 


Potassium  4.1  (3.6-5.0)  mmol/L


 


Chloride  103.8  ()  mmol/L


 


Carbon Dioxide  24  (22-30)  mmol/L


 


BUN  8 L  (9-20)  mg/dL


 


Creatinine  1.0  (0.8-1.5)  mg/dL


 


Glucose  100  ()  mg/dL


 


Calcium  8.5  (8.4-10.2)  mg/dL








 Calcium panel











  09/11/18 Range/Units





  02:31 


 


Calcium  8.5  (8.4-10.2)  mg/dL








 Pituitary panel











  09/11/18 Range/Units





  02:31 


 


Sodium  139  (137-145)  mmol/L


 


Potassium  4.1  (3.6-5.0)  mmol/L


 


Chloride  103.8  ()  mmol/L


 


Carbon Dioxide  24  (22-30)  mmol/L


 


BUN  8 L  (9-20)  mg/dL


 


Creatinine  1.0  (0.8-1.5)  mg/dL


 


Glucose  100  ()  mg/dL


 


Calcium  8.5  (8.4-10.2)  mg/dL








 Adrenal panel











  09/11/18 Range/Units





  02:31 


 


Sodium  139  (137-145)  mmol/L


 


Potassium  4.1  (3.6-5.0)  mmol/L


 


Chloride  103.8  ()  mmol/L


 


Carbon Dioxide  24  (22-30)  mmol/L


 


BUN  8 L  (9-20)  mg/dL


 


Creatinine  1.0  (0.8-1.5)  mg/dL


 


Glucose  100  ()  mg/dL


 


Calcium  8.5  (8.4-10.2)  mg/dL

## 2018-09-11 NOTE — GASTROENTEROLOGY CONSULTATION
<NIVIA MEANS - Last Filed: 09/11/18 13:45>





History of Present Illness





- Reason for Consult


Consult date: 09/11/18


BPR


Requesting physician: KIRT VAZQUEZ





- History of Present Illness


Patient is a 22 y/o male with PMH of HIV who presented to ED with c/o LLQ abd 

pain with associated rectal pain and bright red blood on TP after BMs x 1 week. 

He also admits to generalized body aches and chills with fever noted upon 

admission. CT showed appendix mildly dilated to which surgery as been consulted 

but rectum had normal appearance. This afternoon patient was resting in bed w/o 

acute distress. No active signs of bleeding overnight or this am. No 

hematemesis or melena. Reports sharp rectal pain with defecation that lasts for 

seconds in duration along with scant amount of rectal bleeding intermittently 

on TP after BMs. Symptoms developed after anal intercourse last week. Denies wt 

loss, CP, SOB, dizziness, N/V, diarrhea, or constipation. Last colonoscopy was 

in [place] in 2017 which revealed a possible abscess that required draining per 

patient. 








Past History


Past Medical History: HIV/AIDS


Past Surgical History: Other (possible rectal abscess-s/p drainage 2017)


Social history: denies: smoking, alcohol abuse





Medications and Allergies


 Allergies











Allergy/AdvReac Type Severity Reaction Status Date / Time


 


No Known Allergies Allergy   Verified 09/10/18 14:53











 Home Medications











 Medication  Instructions  Recorded  Confirmed  Last Taken  Type


 


Emtricita/Rilpivirine/Tenof Df 1 each PO DAILY 09/10/18 09/10/18 Unknown History





[Complera Tablet]     











Active Meds: 


Active Medications





Acetaminophen (Tylenol)  650 mg PO Q4H PRN


   PRN Reason: Pain MILD(1-3)/Fever >100.5/HA


Sodium Chloride (Nacl 0.9% 1000 Ml)  1,000 mls @ 150 mls/hr IV AS DIRECT ELIAS


   Last Admin: 09/11/18 02:47 Dose:  150 mls/hr


Piperacillin Sod/Tazobactam Sod (Zosyn/Ns 4.5gm/100ml)  4.5 gm in 100 mls @ 200 

mls/hr IV Q8H ELIAS; Protocol


   Last Admin: 09/11/18 09:49 Dose:  200 mls/hr


Morphine Sulfate (Morphine)  2 mg IV Q4H PRN


   PRN Reason: Pain, Moderate (4-6)


Ondansetron HCl (Zofran)  4 mg IV Q8H PRN


   PRN Reason: Nausea And Vomiting


Sodium Chloride (Sodium Chloride Flush Syringe 10 Ml)  10 ml IV BID ELIAS


   Last Admin: 09/11/18 09:45 Dose:  10 ml


Sodium Chloride (Sodium Chloride Flush Syringe 10 Ml)  10 ml IV PRN PRN


   PRN Reason: LINE FLUSH











Review of Systems





- Review of Systems


All systems: negative


Constitutional: fever, chills


Gastrointestinal: BRBPR, other (rectal pain)





Exam





- Constitutional


Vital Signs: 


 











Temp Pulse Resp BP Pulse Ox


 


 99.2 F   118 H  20   91/50   96 


 


 09/11/18 12:19  09/11/18 12:19  09/11/18 12:19  09/11/18 12:19  09/11/18 12:19











General appearance: no acute distress





- Respiratory


Respiratory: bilateral: CTA





- Cardiovascular


Rhythm: other (tachycardia)





- Gastrointestinal


General gastrointestinal: Present: soft, non-tender, non-distended, normal 

bowel sounds


Rectal Exam: tenderness, stool brown, no mass





- Neurologic


Neurological: alert and oriented x3





- Labs


CBC & Chem 7: 


 09/11/18 09:23





 09/11/18 02:31


Lab Results: 


 Laboratory Results - last 24 hr











  09/10/18 09/10/18 09/10/18





  15:07 15:07 15:07


 


WBC  10.2  


 


RBC  4.47  


 


Hgb  13.7  


 


Hct  40.7  


 


MCV  91  


 


MCH  31  


 


MCHC  34  


 


RDW  15.5 H  


 


Plt Count  278  


 


Lymph % (Auto)  21.1  


 


Mono % (Auto)  12.8 H  


 


Eos % (Auto)  0.0  


 


Baso % (Auto)  0.1  


 


Lymph #  2.2  


 


Mono #  1.3 H  


 


Eos #  0.0  


 


Baso #  0.0  


 


Seg Neutrophils %  66.0  


 


Seg Neutrophils #  6.8  


 


PT   15.3 H 


 


INR   1.15 H 


 


APTT   35.1 


 


VBG pH   


 


Sodium    137


 


Potassium    4.5


 


Chloride    99.7


 


Carbon Dioxide    26


 


Anion Gap    16


 


BUN    9


 


Creatinine    1.1


 


Estimated GFR    > 60


 


BUN/Creatinine Ratio    8


 


Glucose    104 H


 


Lactic Acid   


 


Calcium    9.5


 


Magnesium   


 


Total Bilirubin    0.60


 


AST    24


 


ALT    13


 


Alkaline Phosphatase    75


 


Total Protein    9.2 H


 


Albumin    4.4


 


Albumin/Globulin Ratio    0.9


 


Lipase    14


 


Urine Color   


 


Urine Turbidity   


 


Urine pH   


 


Ur Specific Gravity   


 


Urine Protein   


 


Urine Glucose (UA)   


 


Urine Ketones   


 


Urine Blood   


 


Urine Nitrite   


 


Urine Bilirubin   


 


Urine Urobilinogen   


 


Ur Leukocyte Esterase   


 


Urine WBC (Auto)   


 


Urine RBC (Auto)   


 


U Epithel Cells (Auto)   


 


Urine Mucus   


 


Blood Type   


 


Antibody Screen   














  09/10/18 09/10/18 09/10/18





  15:07 15:07 16:20


 


WBC   


 


RBC   


 


Hgb   


 


Hct   


 


MCV   


 


MCH   


 


MCHC   


 


RDW   


 


Plt Count   


 


Lymph % (Auto)   


 


Mono % (Auto)   


 


Eos % (Auto)   


 


Baso % (Auto)   


 


Lymph #   


 


Mono #   


 


Eos #   


 


Baso #   


 


Seg Neutrophils %   


 


Seg Neutrophils #   


 


PT   


 


INR   


 


APTT   


 


VBG pH   


 


Sodium   


 


Potassium   


 


Chloride   


 


Carbon Dioxide   


 


Anion Gap   


 


BUN   


 


Creatinine   


 


Estimated GFR   


 


BUN/Creatinine Ratio   


 


Glucose   


 


Lactic Acid   1.00 


 


Calcium   


 


Magnesium    1.90


 


Total Bilirubin   


 


AST   


 


ALT   


 


Alkaline Phosphatase   


 


Total Protein   


 


Albumin   


 


Albumin/Globulin Ratio   


 


Lipase   


 


Urine Color   


 


Urine Turbidity   


 


Urine pH   


 


Ur Specific Gravity   


 


Urine Protein   


 


Urine Glucose (UA)   


 


Urine Ketones   


 


Urine Blood   


 


Urine Nitrite   


 


Urine Bilirubin   


 


Urine Urobilinogen   


 


Ur Leukocyte Esterase   


 


Urine WBC (Auto)   


 


Urine RBC (Auto)   


 


U Epithel Cells (Auto)   


 


Urine Mucus   


 


Blood Type  O NEGATIVE  


 


Antibody Screen  Negative  














  09/10/18 09/10/18 09/10/18





  16:20 16:20 16:20


 


WBC   


 


RBC   


 


Hgb   


 


Hct   


 


MCV   


 


MCH   


 


MCHC   


 


RDW   


 


Plt Count   


 


Lymph % (Auto)   


 


Mono % (Auto)   


 


Eos % (Auto)   


 


Baso % (Auto)   


 


Lymph #   


 


Mono #   


 


Eos #   


 


Baso #   


 


Seg Neutrophils %   


 


Seg Neutrophils #   


 


PT  15.3 H  


 


INR  1.15 H  


 


APTT   


 


VBG pH    7.340


 


Sodium   


 


Potassium   


 


Chloride   


 


Carbon Dioxide   


 


Anion Gap   


 


BUN   


 


Creatinine   


 


Estimated GFR   


 


BUN/Creatinine Ratio   


 


Glucose   


 


Lactic Acid   1.20 


 


Calcium   


 


Magnesium   


 


Total Bilirubin   


 


AST   


 


ALT   


 


Alkaline Phosphatase   


 


Total Protein   


 


Albumin   


 


Albumin/Globulin Ratio   


 


Lipase   


 


Urine Color   


 


Urine Turbidity   


 


Urine pH   


 


Ur Specific Gravity   


 


Urine Protein   


 


Urine Glucose (UA)   


 


Urine Ketones   


 


Urine Blood   


 


Urine Nitrite   


 


Urine Bilirubin   


 


Urine Urobilinogen   


 


Ur Leukocyte Esterase   


 


Urine WBC (Auto)   


 


Urine RBC (Auto)   


 


U Epithel Cells (Auto)   


 


Urine Mucus   


 


Blood Type   


 


Antibody Screen   














  09/10/18 09/10/18 09/11/18





  19:45 20:02 02:31


 


WBC    7.8


 


RBC    3.99


 


Hgb    12.3


 


Hct    36.6


 


MCV    92


 


MCH    31


 


MCHC    34


 


RDW    15.2


 


Plt Count    207


 


Lymph % (Auto)    16.5


 


Mono % (Auto)    13.7 H


 


Eos % (Auto)    0.1


 


Baso % (Auto)    0.3


 


Lymph #    1.3


 


Mono #    1.1 H


 


Eos #    0.0


 


Baso #    0.0


 


Seg Neutrophils %    69.4


 


Seg Neutrophils #    5.4


 


PT   


 


INR   


 


APTT   


 


VBG pH   


 


Sodium   


 


Potassium   


 


Chloride   


 


Carbon Dioxide   


 


Anion Gap   


 


BUN   


 


Creatinine   


 


Estimated GFR   


 


BUN/Creatinine Ratio   


 


Glucose   


 


Lactic Acid   1.40 


 


Calcium   


 


Magnesium   


 


Total Bilirubin   


 


AST   


 


ALT   


 


Alkaline Phosphatase   


 


Total Protein   


 


Albumin   


 


Albumin/Globulin Ratio   


 


Lipase   


 


Urine Color  Yellow  


 


Urine Turbidity  Clear  


 


Urine pH  5.0  


 


Ur Specific Gravity  1.026  


 


Urine Protein  <15 mg/dl  


 


Urine Glucose (UA)  Neg  


 


Urine Ketones  Neg  


 


Urine Blood  Mod  


 


Urine Nitrite  Neg  


 


Urine Bilirubin  Neg  


 


Urine Urobilinogen  < 2.0  


 


Ur Leukocyte Esterase  Neg  


 


Urine WBC (Auto)  2.0  


 


Urine RBC (Auto)  50.0  


 


U Epithel Cells (Auto)  1.0  


 


Urine Mucus  1+  


 


Blood Type   


 


Antibody Screen   














  09/11/18 09/11/18 09/11/18





  02:31 04:36 09:23


 


WBC   


 


RBC   


 


Hgb   11.9  12.1


 


Hct   35.1 L  35.9


 


MCV   


 


MCH   


 


MCHC   


 


RDW   


 


Plt Count   


 


Lymph % (Auto)   


 


Mono % (Auto)   


 


Eos % (Auto)   


 


Baso % (Auto)   


 


Lymph #   


 


Mono #   


 


Eos #   


 


Baso #   


 


Seg Neutrophils %   


 


Seg Neutrophils #   


 


PT   


 


INR   


 


APTT   


 


VBG pH   


 


Sodium  139  


 


Potassium  4.1  


 


Chloride  103.8  


 


Carbon Dioxide  24  


 


Anion Gap  15  


 


BUN  8 L  


 


Creatinine  1.0  


 


Estimated GFR  > 60  


 


BUN/Creatinine Ratio  8  


 


Glucose  100  


 


Lactic Acid   


 


Calcium  8.5  


 


Magnesium   


 


Total Bilirubin   


 


AST   


 


ALT   


 


Alkaline Phosphatase   


 


Total Protein   


 


Albumin   


 


Albumin/Globulin Ratio   


 


Lipase   


 


Urine Color   


 


Urine Turbidity   


 


Urine pH   


 


Ur Specific Gravity   


 


Urine Protein   


 


Urine Glucose (UA)   


 


Urine Ketones   


 


Urine Blood   


 


Urine Nitrite   


 


Urine Bilirubin   


 


Urine Urobilinogen   


 


Ur Leukocyte Esterase   


 


Urine WBC (Auto)   


 


Urine RBC (Auto)   


 


U Epithel Cells (Auto)   


 


Urine Mucus   


 


Blood Type   


 


Antibody Screen   














Assessment and Plan


1.rectal pain


 2.BRBPR


 3.h/o rectal abscess


 4.HIV


 5.fever





-temp 99.2


-WBC-WNL


-CT showed borderline findings for acute appendicitis but rectum normal


-surgery consult pending


-H/H 12.1/35.9- stable


-continue to monitor H/H and transfuse as needed


-no active signs of bleeding- rectal revealed brown stool w/o blood but rectal 

pain was reproduced with exam


-colonoscopy last year per patient showed a possible abscess that required 

draining


-etiology unclear- possible fissure from recent trauma vs infection vs other(

abscess?)


-will order MRI of abd/pelvis for further evaluation


-consider flex sig based on progress/results


-recommend ID consult


-continue antibiotics and supportive care


-will follow














<MYESHA JAMES - Last Filed: 09/12/18 13:19>





History of Present Illness





- History of Present Illness





The patient seen and examined. He has no significant rectal discomfort and 

denies bleeding.  He had colonoscopy a year ago.  Await MRI, if negative, 

conservative care.





Medications and Allergies


Active Meds: 


Active Medications





Acetaminophen (Tylenol)  650 mg PO Q4H PRN


   PRN Reason: Pain MILD(1-3)/Fever >100.5/HA


   Last Admin: 09/11/18 23:51 Dose:  650 mg


Sodium Chloride (Nacl 0.9% 1000 Ml)  1,000 mls @ 150 mls/hr IV AS DIRECT ELIAS


   Last Admin: 09/12/18 10:28 Dose:  150 mls/hr


Doxycycline Hyclate 100 mg/ (Sodium Chloride)  250 mls @ 250 mls/hr IV Q12HR ELIAS

; Protocol


   Last Admin: 09/12/18 10:29 Dose:  250 mls/hr


Metronidazole (Flagyl 500 Mg/100 Ml)  500 mg in 100 mls @ 100 mls/hr IV Q8HR ELIAS

; Protocol


   Last Admin: 09/12/18 05:53 Dose:  100 mls/hr


Ceftriaxone Sodium (Rocephin/Ns 2 Gm/100 Ml)  2 gm in 100 mls @ 200 mls/hr IV 

Q24HR ELIAS


   Last Admin: 09/12/18 13:02 Dose:  200 mls/hr


Morphine Sulfate (Morphine)  2 mg IV Q4H PRN


   PRN Reason: Pain, Moderate (4-6)


Ondansetron HCl (Zofran)  4 mg IV Q8H PRN


   PRN Reason: Nausea And Vomiting


   Last Admin: 09/11/18 15:36 Dose:  4 mg


Sodium Chloride (Sodium Chloride Flush Syringe 10 Ml)  10 ml IV BID [facility]


   Last Admin: 09/12/18 10:33 Dose:  10 ml


Sodium Chloride (Sodium Chloride Flush Syringe 10 Ml)  10 ml IV PRN PRN


   PRN Reason: LINE FLUSH


Valacyclovir HCl (Valtrex)  1,000 mg PO TID [facility]


   Last Admin: 09/12/18 08:37 Dose:  1,000 mg











Exam





- Constitutional


Vital Signs: 


 











Temp Pulse Resp BP Pulse Ox


 


 98.7 F   80   18   105/61   97 


 


 09/12/18 11:35  09/12/18 11:35  09/12/18 11:35  09/12/18 11:35  09/12/18 11:35














- Labs


CBC & Chem 7: 


 09/12/18 05:50





 09/11/18 02:31


Lab Results: 


 Laboratory Results - last 24 hr











  09/11/18 09/11/18 09/11/18





  20:45 20:45 Unknown


 


WBC   


 


RBC   


 


Hgb   


 


Hct   


 


MCV   


 


MCH   


 


MCHC   


 


RDW   


 


Plt Count   


 


Mono % (Auto)   


 


Add Manual Diff   


 


Total Counted   


 


Seg Neuts % (Manual)   


 


Band Neutrophils %   


 


Lymphocytes % (Manual)   


 


Reactive Lymphs % (Man)   


 


Monocytes % (Manual)   


 


Eosinophils % (Manual)   


 


Basophils % (Manual)   


 


Metamyelocytes %   


 


Myelocytes %   


 


Promyelocytes %   


 


Blast Cells %   


 


Nucleated RBC %   


 


Seg Neutrophils # Man   


 


Band Neutrophils #   


 


Lymphocytes # (Manual)   


 


Abs React Lymphs (Man)   


 


Monocytes # (Manual)   


 


Eosinophils # (Manual)   


 


Basophils # (Manual)   


 


Metamyelocytes #   


 


Myelocytes #   


 


Promyelocytes #   


 


Blast Cells #   


 


WBC Morphology   


 


Hypersegmented Neuts   


 


Hyposegmented Neuts   


 


Hypogranular Neuts   


 


Smudge Cells   


 


Toxic Granulation   


 


Toxic Vacuolation   


 


Dohle Bodies   


 


Pelger-Huet Anomaly   


 


Miguel A Rods   


 


Platelet Estimate   


 


Clumped Platelets   


 


Plt Clumps, EDTA   


 


Large Platelets   


 


Giant Platelets   


 


Platelet Satelliting   


 


Plt Morphology Comment   


 


RBC Morphology   


 


Dimorphic RBCs   


 


Polychromasia   


 


Hypochromasia   


 


Poikilocytosis   


 


Anisocytosis   


 


Microcytosis   


 


Macrocytosis   


 


Spherocytes   


 


Pappenheimer Bodies   


 


Sickle Cells   


 


Target Cells   


 


Tear Drop Cells   


 


Ovalocytes   


 


Helmet Cells   


 


Connor-Bivalve Bodies   


 


Cabot Rings   


 


[place]enia Cells   


 


Bite Cells   


 


Crenated Cell   


 


Elliptocytes   


 


Acanthocytes (Spur)   


 


Rouleaux   


 


Hemoglobin C Crystals   


 


Schistocytes   


 


Malaria parasites   


 


Escobar Bodies   


 


Hem Pathologist Commnt   


 


C-Reactive Protein  6.10 H  


 


RPR   Nonreactive 


 


Group A Strep Rapid    Negative














  09/12/18





  05:50


 


WBC  3.8 L


 


RBC  3.59 L


 


Hgb  11.1 L


 


Hct  32.9 L


 


MCV  92


 


MCH  31


 


MCHC  34


 


RDW  15.0


 


Plt Count  176


 


Mono % (Auto)  Np


 


Add Manual Diff  Complete


 


Total Counted  100


 


Seg Neuts % (Manual)  75.0 H


 


Band Neutrophils %  0


 


Lymphocytes % (Manual)  11.0 L


 


Reactive Lymphs % (Man)  1.0


 


Monocytes % (Manual)  12.0 H


 


Eosinophils % (Manual)  1.0


 


Basophils % (Manual)  0


 


Metamyelocytes %  0


 


Myelocytes %  0


 


Promyelocytes %  0


 


Blast Cells %  0


 


Nucleated RBC %  Not Reportable


 


Seg Neutrophils # Man  2.9


 


Band Neutrophils #  0.0


 


Lymphocytes # (Manual)  0.4 L


 


Abs React Lymphs (Man)  0.0


 


Monocytes # (Manual)  0.5


 


Eosinophils # (Manual)  0.0


 


Basophils # (Manual)  0.0


 


Metamyelocytes #  0.0


 


Myelocytes #  0.0


 


Promyelocytes #  0.0


 


Blast Cells #  0.0


 


WBC Morphology  Not Reportable


 


Hypersegmented Neuts  Not Reportable


 


Hyposegmented Neuts  Not Reportable


 


Hypogranular Neuts  Not Reportable


 


Smudge Cells  Not Reportable


 


Toxic Granulation  Not Reportable


 


Toxic Vacuolation  Not Reportable


 


Dohle Bodies  Not Reportable


 


Pelger-Huet Anomaly  Not Reportable


 


Miguel A Rods  Not Reportable


 


Platelet Estimate  Cons


 


Clumped Platelets  Not Reportable


 


Plt Clumps, EDTA  Not Reportable


 


Large Platelets  Not Reportable


 


Giant Platelets  Not Reportable


 


Platelet Satelliting  Not Reportable


 


Plt Morphology Comment  Not Reportable


 


RBC Morphology  Not Reportable


 


Dimorphic RBCs  Not Reportable


 


Polychromasia  Not Reportable


 


Hypochromasia  Not Reportable


 


Poikilocytosis  Not Reportable


 


Anisocytosis  1+


 


Microcytosis  Not Reportable


 


Macrocytosis  Not Reportable


 


Spherocytes  Not Reportable


 


Pappenheimer Bodies  Not Reportable


 


Sickle Cells  Not Reportable


 


Target Cells  Not Reportable


 


Tear Drop Cells  Not Reportable


 


Ovalocytes  Not Reportable


 


Helmet Cells  Not Reportable


 


Connor-Bivalve Bodies  Not Reportable


 


Cabot Rings  Not Reportable


 


[place] Cells  Not Reportable


 


Bite Cells  Not Reportable


 


Crenated Cell  Not Reportable


 


Elliptocytes  Not Reportable


 


Acanthocytes (Spur)  Not Reportable


 


Rouleaux  Not Reportable


 


Hemoglobin C Crystals  Not Reportable


 


Schistocytes  Not Reportable


 


Malaria parasites  Not Reportable


 


Escobar Bodies  Not Reportable


 


Hem Pathologist Commnt  No


 


C-Reactive Protein 


 


RPR 


 


Group A Strep Rapid

## 2018-09-11 NOTE — CONSULTATION
History of Present Illness





- Reason for Consult


Consult date: 09/11/18


HIV fever


Requesting physician: NIVIA MEANS





- History of Present Illness


32-year-old man with a history HIV since 2013, last GM7=644, VL undetectable in 

June 2018, his HIV provider is in [place]. He moved to [place] 1 y and 1/2 ago 

but he has not established HIV care here. He went to [place] in June and saw 

his PCP. He was admitted on 9/10/18 due to 3 day-history of acute sore throat, 

malaise, body aches, fever and frontal headache. He also reports abdominal pain 

located in the left lower quadrant which she described as sharp, intermittent, 

lasting for a few seconds, intensity5/10, no radiation. Denies nausea, 

vomiting. Reports blood per rectum only with bowel movements, once a day over 

the last 1 week. He works as a  in [place]. Report occasional 

ETOH. He is MSM. He reported to one of the providers symptoms developed after 

anal intercourse last week, however he reported to me that last anal intercouse 

was 2 months ago. Of note, last colonoscopy was in [place] in 2017 which 

revealed a possible abscess that required draining per patient. 


 


In the ED, temp 101.1, , R 18, /73. WBC 10.2. Hg 13.7. Plat 278. 

Creat 1.1. UA neg. Occult blood positive. CT showed appendix mildly dilated, 

rectum had normal appearance. 





Microbiology:





Blood cultures:


9/10 ngtd





Urine cultures:


9/10 neg





 


Current Antimicrobials:


Zosyn 9/11





Previous Antimicrobials:





Past History


Past Medical History: HIV/AIDS


Past Surgical History: Other (possible rectal abscess-s/p drainage 2017)


Social history: denies: smoking, alcohol abuse





Medications and Allergies


 Allergies











Allergy/AdvReac Type Severity Reaction Status Date / Time


 


No Known Allergies Allergy   Verified 09/10/18 14:53











 Home Medications











 Medication  Instructions  Recorded  Confirmed  Last Taken  Type


 


Emtricita/Rilpivirine/Tenof Df 1 each PO DAILY 09/10/18 09/10/18 Unknown History





[Complera Tablet]     











Active Meds: 


Active Medications





Acetaminophen (Tylenol)  650 mg PO Q4H PRN


   PRN Reason: Pain MILD(1-3)/Fever >100.5/HA


   Last Admin: 09/11/18 15:36 Dose:  650 mg


Sodium Chloride (Nacl 0.9% 1000 Ml)  1,000 mls @ 150 mls/hr IV AS DIRECT ELIAS


   Last Admin: 09/11/18 15:40 Dose:  150 mls/hr


Piperacillin Sod/Tazobactam Sod (Zosyn/Ns 4.5gm/100ml)  4.5 gm in 100 mls @ 200 

mls/hr IV Q8H ELIAS; Protocol


   Last Admin: 09/11/18 09:49 Dose:  200 mls/hr


Morphine Sulfate (Morphine)  2 mg IV Q4H PRN


   PRN Reason: Pain, Moderate (4-6)


Ondansetron HCl (Zofran)  4 mg IV Q8H PRN


   PRN Reason: Nausea And Vomiting


   Last Admin: 09/11/18 15:36 Dose:  4 mg


Sodium Chloride (Sodium Chloride Flush Syringe 10 Ml)  10 ml IV BID ELIAS


   Last Admin: 09/11/18 09:45 Dose:  10 ml


Sodium Chloride (Sodium Chloride Flush Syringe 10 Ml)  10 ml IV PRN PRN


   PRN Reason: LINE FLUSH











Review of Systems


All systems: negative (as per HPI)





Physical Examination





- Physical Exam


Narrative exam: 





General appearance: Alert in NAD, conversant 


Eyes: anicteric sclerae, moist conjunctivae; no lid-lag; PERRLA 


HENT: Atraumatic; oropharynx + erthematous edematous tonsils and no mucosal 

ulcerations/no oral thrush; normal hard and soft palate. Normal external ears.


Neck: Trachea midline; supple, no thyromegaly or lymphadenopathy 


Lungs: CTA, with normal respiratory effort and no intercostal retractions 


CV: RRR, no murmurs


Abdomen: Soft, non-tender; no masses or hepatosplenomegaly


Extremities: No peripheral edema or extremity lymphadenopathy


Skin: Normal temperature, turgor and texture; no rash, ulcers or subcutaneous 

nodules 


Psych: Appropriate affect, alert and oriented to person, place and time. Neuro: 

alert and oriented x 3. Moving all extermities


Lines: No CVL / PICC











- Constitutional


Vitals: 


 Vital Signs











Temp Pulse Resp BP Pulse Ox


 


 99.2 F   118 H  20   91/50   96 


 


 09/11/18 12:19  09/11/18 12:19  09/11/18 12:19  09/11/18 12:19  09/11/18 12:19








 Temperature -Last 24 Hours











Temperature                    99.2 F


 


Temperature                    99.0 F


 


Temperature                    99.9 F


 


Temperature                    98.2 F

















Results





- Labs


CBC & Chem 7: 


 09/11/18 09:23





 09/11/18 02:31


Labs: 


 Abnormal lab results











  09/10/18 09/10/18 09/11/18 Range/Units





  15:07 16:20 02:31 


 


Hct     (35.5-45.6)  %


 


Mono % (Auto)    13.7 H  (0.0-7.3)  %


 


Mono #    1.1 H  (0.0-0.8)  K/mm3


 


PT   15.3 H   (12.2-14.9)  Sec.


 


INR   1.15 H   (0.87-1.13)  


 


BUN     (9-20)  mg/dL


 


Glucose  104 H    ()  mg/dL


 


Total Protein  9.2 H    (6.3-8.2)  g/dL














  09/11/18 09/11/18 Range/Units





  02:31 04:36 


 


Hct   35.1 L  (35.5-45.6)  %


 


Mono % (Auto)    (0.0-7.3)  %


 


Mono #    (0.0-0.8)  K/mm3


 


PT    (12.2-14.9)  Sec.


 


INR    (0.87-1.13)  


 


BUN  8 L   (9-20)  mg/dL


 


Glucose    ()  mg/dL


 


Total Protein    (6.3-8.2)  g/dL














Assessment and Plan





Assessment: 


1) SIRS: Present on admission, manifested by fever, tachycardia, hypotension.  

Etiology unclear. DDX: Strep throat, influenza, proctitis/rectal abscess/STD. 

Doubt opportunistic infection.


2) HIV infection: since 2013, last NV1=068, VL undetectable in June 2018, his 

HIV provider is in [place]. Takes complera. He moved to [place] 1 y and 1/2 ago 

but he has not established HIV care here. He went to [place] in June and saw 

his PCP. 


3) Rectal pain/bleeding: DDx: proctitis/rectal trauma infected/abscess/STD (HSV/

syphilis/HSV/Chlamydia/LGV). He is MSM. He reported to one of the providers 

symptoms developed after anal intercourse last week, however he reported to me 

that last anal intercouse was 2 months ago. Of note, last colonoscopy was in 

[place] in 2017 which revealed a possible abscess that required draining per 

patient.  


   -Occult blood positive. 


   -CT showed appendix mildly dilated, rectum had normal appearance. 





Plan:


-follow-up blood cultures


-obtain HSV serology, RPR, CRP, GC and Chlamydia in urine


-influenza test


-strep rapid throat 


-agree with colonoscopy


-stop zosyn


-start ceftriaxone, flagyl, doxycycline and acyclovir


-continue complera





Thank you for your consultation, will follow up with you. 





Kaleigh Lau MD


Infectious Diseases Specialist


[facility] ([facility]


M [phone]


O [phone]

## 2018-09-11 NOTE — PROGRESS NOTE
Assessment and Plan


Assessment and plan: 


Abdominal pain.


Etiology unclear


To r/o appendicitis. 


surgeon consulted





Bright red blood per rectum.


Monitor H/H


GI consulted





HIV infection.


Consult ID Physician





Fever.


Blood cultures ordered





Full code status














History


Interval history: 


Abdominal pain,


Bright red blood per rectum


Fever








Hospitalist Physical





- Physical exam


Narrative exam: 


GEN:Not in acute distress, lying in bed


HEENT: Normocephalic, atraumatic,


Neck: supple, No JVD


Lungs: Clear to auscultaion bilaterally, no crackles 


Heart:S1 and S2 reg, no murmurs, rubs or gallop


Abd:soft, non-tender, non-distended, Normal bowel sounds


Ext: No edema, clubbing or cyanosis


Neuro: Awake, alert, oriented X 3, Moves all extremities








- Constitutional


Vitals: 


 











Temp Pulse Resp BP Pulse Ox


 


 99.2 F   118 H  20   91/50   96 


 


 09/11/18 12:19  09/11/18 12:19  09/11/18 12:19  09/11/18 12:19  09/11/18 12:19














Results





- Labs


CBC & Chem 7: 


 09/11/18 09:23





 09/11/18 02:31


Labs: 


 Laboratory Last Values











WBC  7.8 K/mm3 (4.5-11.0)   09/11/18  02:31    


 


RBC  3.99 M/mm3 (3.65-5.03)   09/11/18  02:31    


 


Hgb  12.1 gm/dl (11.8-15.2)   09/11/18  09:23    


 


Hct  35.9 % (35.5-45.6)   09/11/18  09:23    


 


MCV  92 fl (84-94)   09/11/18  02:31    


 


MCH  31 pg (28-32)   09/11/18  02:31    


 


MCHC  34 % (32-34)   09/11/18  02:31    


 


RDW  15.2 % (13.2-15.2)   09/11/18  02:31    


 


Plt Count  207 K/mm3 (140-440)   09/11/18  02:31    


 


Lymph % (Auto)  16.5 % (13.4-35.0)   09/11/18  02:31    


 


Mono % (Auto)  13.7 % (0.0-7.3)  H  09/11/18  02:31    


 


Eos % (Auto)  0.1 % (0.0-4.3)   09/11/18  02:31    


 


Baso % (Auto)  0.3 % (0.0-1.8)   09/11/18  02:31    


 


Lymph #  1.3 K/mm3 (1.2-5.4)   09/11/18  02:31    


 


Mono #  1.1 K/mm3 (0.0-0.8)  H  09/11/18  02:31    


 


Eos #  0.0 K/mm3 (0.0-0.4)   09/11/18  02:31    


 


Baso #  0.0 K/mm3 (0.0-0.1)   09/11/18  02:31    


 


Seg Neutrophils %  69.4 % (40.0-70.0)   09/11/18  02:31    


 


Seg Neutrophils #  5.4 K/mm3 (1.8-7.7)   09/11/18  02:31    


 


PT  15.3 Sec. (12.2-14.9)  H  09/10/18  16:20    


 


INR  1.15  (0.87-1.13)  H  09/10/18  16:20    


 


APTT  35.1 Sec. (24.2-36.6)   09/10/18  15:07    


 


VBG pH  7.340  (7.320-7.420)   09/10/18  16:20    


 


Sodium  139 mmol/L (137-145)   09/11/18  02:31    


 


Potassium  4.1 mmol/L (3.6-5.0)   09/11/18  02:31    


 


Chloride  103.8 mmol/L ()   09/11/18  02:31    


 


Carbon Dioxide  24 mmol/L (22-30)   09/11/18  02:31    


 


Anion Gap  15 mmol/L  09/11/18  02:31    


 


BUN  8 mg/dL (9-20)  L  09/11/18  02:31    


 


Creatinine  1.0 mg/dL (0.8-1.5)   09/11/18  02:31    


 


Estimated GFR  > 60 ml/min  09/11/18  02:31    


 


BUN/Creatinine Ratio  8 %  09/11/18  02:31    


 


Glucose  100 mg/dL ()   09/11/18  02:31    


 


Lactic Acid  1.40 mmol/L (0.7-2.0)   09/10/18  20:02    


 


Calcium  8.5 mg/dL (8.4-10.2)   09/11/18  02:31    


 


Magnesium  1.90 mg/dL (1.7-2.3)   09/10/18  16:20    


 


Total Bilirubin  0.60 mg/dL (0.1-1.2)   09/10/18  15:07    


 


AST  24 units/L (5-40)   09/10/18  15:07    


 


ALT  13 units/L (7-56)   09/10/18  15:07    


 


Alkaline Phosphatase  75 units/L ()   09/10/18  15:07    


 


Total Protein  9.2 g/dL (6.3-8.2)  H  09/10/18  15:07    


 


Albumin  4.4 g/dL (3.9-5)   09/10/18  15:07    


 


Albumin/Globulin Ratio  0.9 %  09/10/18  15:07    


 


Lipase  14 units/L (13-60)   09/10/18  15:07    


 


Urine Color  Yellow  (Yellow)   09/10/18  19:45    


 


Urine Turbidity  Clear  (Clear)   09/10/18  19:45    


 


Urine pH  5.0  (5.0-7.0)   09/10/18  19:45    


 


Ur Specific Gravity  1.026  (1.003-1.030)   09/10/18  19:45    


 


Urine Protein  <15 mg/dl mg/dL (Negative)   09/10/18  19:45    


 


Urine Glucose (UA)  Neg mg/dL (Negative)   09/10/18  19:45    


 


Urine Ketones  Neg mg/dL (Negative)   09/10/18  19:45    


 


Urine Blood  Mod  (Negative)   09/10/18  19:45    


 


Urine Nitrite  Neg  (Negative)   09/10/18  19:45    


 


Urine Bilirubin  Neg  (Negative)   09/10/18  19:45    


 


Urine Urobilinogen  < 2.0 mg/dL (<2.0)   09/10/18  19:45    


 


Ur Leukocyte Esterase  Neg  (Negative)   09/10/18  19:45    


 


Urine WBC (Auto)  2.0 /HPF (0.0-6.0)   09/10/18  19:45    


 


Urine RBC (Auto)  50.0 /HPF (0.0-6.0)   09/10/18  19:45    


 


U Epithel Cells (Auto)  1.0 /HPF (0-13.0)   09/10/18  19:45    


 


Urine Mucus  1+ /HPF  09/10/18  19:45    


 


Blood Type  O NEGATIVE   09/10/18  15:07    


 


Antibody Screen  Negative   09/10/18  15:07

## 2018-09-12 LAB
ANISOCYTOSIS BLD QL SMEAR: (no result)
BAND NEUTROPHILS # (MANUAL): 0 K/MM3
HCT VFR BLD CALC: 32.9 % (ref 35.5–45.6)
HGB BLD-MCNC: 11.1 GM/DL (ref 11.8–15.2)
MCH RBC QN AUTO: 31 PG (ref 28–32)
MCHC RBC AUTO-ENTMCNC: 34 % (ref 32–34)
MCV RBC AUTO: 92 FL (ref 84–94)
MYELOCYTES # (MANUAL): 0 K/MM3
PLATELET # BLD: 176 K/MM3 (ref 140–440)
PROMYELOCYTES # (MANUAL): 0 K/MM3
RBC # BLD AUTO: 3.59 M/MM3 (ref 3.65–5.03)
TOTAL CELLS COUNTED BLD: 100

## 2018-09-12 RX ADMIN — METRONIDAZOLE SCH MLS/HR: 5 INJECTION, SOLUTION INTRAVENOUS at 13:37

## 2018-09-12 RX ADMIN — CEFTRIAXONE SODIUM SCH: 2 INJECTION, POWDER, FOR SOLUTION INTRAMUSCULAR; INTRAVENOUS at 13:03

## 2018-09-12 RX ADMIN — METRONIDAZOLE SCH MLS/HR: 5 INJECTION, SOLUTION INTRAVENOUS at 22:50

## 2018-09-12 RX ADMIN — SODIUM CHLORIDE SCH MLS/HR: 0.9 INJECTION, SOLUTION INTRAVENOUS at 10:28

## 2018-09-12 RX ADMIN — CEFTRIAXONE SODIUM SCH MLS/HR: 2 INJECTION, POWDER, FOR SOLUTION INTRAMUSCULAR; INTRAVENOUS at 13:02

## 2018-09-12 RX ADMIN — VALACYCLOVIR HYDROCHLORIDE SCH MG: 500 TABLET, FILM COATED ORAL at 13:37

## 2018-09-12 RX ADMIN — Medication SCH ML: at 10:33

## 2018-09-12 RX ADMIN — METRONIDAZOLE SCH MLS/HR: 5 INJECTION, SOLUTION INTRAVENOUS at 05:53

## 2018-09-12 RX ADMIN — DOXYCYCLINE SCH MLS/HR: 100 INJECTION, POWDER, LYOPHILIZED, FOR SOLUTION INTRAVENOUS at 10:29

## 2018-09-12 RX ADMIN — DOXYCYCLINE SCH MLS/HR: 100 INJECTION, POWDER, LYOPHILIZED, FOR SOLUTION INTRAVENOUS at 22:48

## 2018-09-12 RX ADMIN — VALACYCLOVIR HYDROCHLORIDE SCH MG: 500 TABLET, FILM COATED ORAL at 08:37

## 2018-09-12 NOTE — GASTROENTEROLOGY PROGRESS NOTE
Assessment and Plan


1.rectal pain


 2.BRBPR


 3.h/o rectal abscess


 4.HIV


 5.fever





-afebrile


-WBC 3.8


-H/H 11.1/32/9-stable


-CT showed borderline findings for acute appendicitis but rectum normal


-surgery consult pending


-H/H 12.1/35.9- stable


-continue to monitor H/H and transfuse as needed


-BM x 1 this am with continued rectal pain and scant blood on TP only


-colonoscopy 2017 in [place] per patient showed a possible abscess that 

required draining


-etiology unclear- possible fissure from recent trauma vs infection vs other(

abscess?)


-MRI of pelvis pending for further evaluation


-will consider flex sig based on progress/results


-ID and surgery following


-continue antibiotics and supportive care


-will follow














Subjective


Date of service: 09/12/18


Principal diagnosis: BPR


Interval history: 


Patient w/o acute distress. Reports BM x 1 this am with continued rectal pain 

and scant amount of bleeding on TP only. No abd pain or N/V.








Objective





- Constitutional


Vitals: 


 











Temp Pulse Resp BP Pulse Ox


 


 98.4 F   88   16   107/71   97 


 


 09/12/18 05:42  09/12/18 05:42  09/12/18 05:42  09/12/18 05:42  09/12/18 05:42











General appearance: no acute distress





- Respiratory


Respiratory: bilateral: CTA





- Cardiovascular


Rhythm: regular


Heart Sounds: Present: S1 & S2





- Gastrointestinal


General gastrointestinal: Present: soft, non-tender, non-distended, normal 

bowel sounds





- Neurologic


Neurological: alert and oriented x3





- Labs


CBC & Chem 7: 


 09/12/18 05:50





 09/11/18 02:31


Labs: 


 Laboratory Results - last 24 hr











  09/11/18 09/11/18 09/12/18





  20:45 Unknown 05:50


 


WBC    3.8 L


 


RBC    3.59 L


 


Hgb    11.1 L


 


Hct    32.9 L


 


MCV    92


 


MCH    31


 


MCHC    34


 


RDW    15.0


 


Plt Count    176


 


Mono % (Auto)    Np


 


Add Manual Diff    Complete


 


Total Counted    100


 


Seg Neuts % (Manual)    75.0 H


 


Band Neutrophils %    0


 


Lymphocytes % (Manual)    11.0 L


 


Reactive Lymphs % (Man)    1.0


 


Monocytes % (Manual)    12.0 H


 


Eosinophils % (Manual)    1.0


 


Basophils % (Manual)    0


 


Metamyelocytes %    0


 


Myelocytes %    0


 


Promyelocytes %    0


 


Blast Cells %    0


 


Nucleated RBC %    Not Reportable


 


Seg Neutrophils # Man    2.9


 


Band Neutrophils #    0.0


 


Lymphocytes # (Manual)    0.4 L


 


Abs React Lymphs (Man)    0.0


 


Monocytes # (Manual)    0.5


 


Eosinophils # (Manual)    0.0


 


Basophils # (Manual)    0.0


 


Metamyelocytes #    0.0


 


Myelocytes #    0.0


 


Promyelocytes #    0.0


 


Blast Cells #    0.0


 


WBC Morphology    Not Reportable


 


Hypersegmented Neuts    Not Reportable


 


Hyposegmented Neuts    Not Reportable


 


Hypogranular Neuts    Not Reportable


 


Smudge Cells    Not Reportable


 


Toxic Granulation    Not Reportable


 


Toxic Vacuolation    Not Reportable


 


Dohle Bodies    Not Reportable


 


Pelger-Huet Anomaly    Not Reportable


 


Miguel A Rods    Not Reportable


 


Platelet Estimate    Cons


 


Clumped Platelets    Not Reportable


 


Plt Clumps, EDTA    Not Reportable


 


Large Platelets    Not Reportable


 


Giant Platelets    Not Reportable


 


Platelet Satelliting    Not Reportable


 


Plt Morphology Comment    Not Reportable


 


RBC Morphology    Not Reportable


 


Dimorphic RBCs    Not Reportable


 


Polychromasia    Not Reportable


 


Hypochromasia    Not Reportable


 


Poikilocytosis    Not Reportable


 


Anisocytosis    1+


 


Microcytosis    Not Reportable


 


Macrocytosis    Not Reportable


 


Spherocytes    Not Reportable


 


Pappenheimer Bodies    Not Reportable


 


Sickle Cells    Not Reportable


 


Target Cells    Not Reportable


 


Tear Drop Cells    Not Reportable


 


Ovalocytes    Not Reportable


 


Helmet Cells    Not Reportable


 


Connor-[place] Bodies    Not Reportable


 


[place] Rings    Not Reportable


 


[place] Cells    Not Reportable


 


Bite Cells    Not Reportable


 


Crenated Cell    Not Reportable


 


Elliptocytes    Not Reportable


 


Acanthocytes (Spur)    Not Reportable


 


Rouleaux    Not Reportable


 


Hemoglobin C Crystals    Not Reportable


 


Schistocytes    Not Reportable


 


Malaria parasites    Not Reportable


 


Escobar Bodies    Not Reportable


 


Hem Pathologist Commnt    No


 


C-Reactive Protein  6.10 H  


 


Group A Strep Rapid   Negative

## 2018-09-12 NOTE — PROGRESS NOTE
Assessment and Plan





Assessment: 


1) SIRS: Improving.  Etiology unclear. DDX: Strep throat, influenza, proctitis/

rectal abscess/STD. Doubt opportunistic infection.


               - Rapid Strep test negative


               - Blood culture - ngtd


                  -Urine culture - ngtd


                   -Chest xray - neg


 2) HIV infection: since 2013, last KK1=896, VL undetectable in June 2018, his 

HIV provider is in [place]. Takes complera. He moved to [place] 1 y and 1/2 ago 

but he has not established HIV care here. He went to [place] in June and saw 

his PCP


              -CRP - 6.1. 


3) Rectal pain/bleeding: DDx: proctitis/rectal trauma infected/abscess/STD (HSV/

syphilis/HSV/Chlamydia/LGV). He is MSM. He reported to one of the providers 

symptoms developed after anal intercourse last week, however he reported to me 

that last anal intercouse was 2 months ago. Of note, last colonoscopy was in 

[place] in 2017 which revealed a possible abscess that required draining per 

patient.  


   -Occult blood positive. 


   -CT showed appendix mildly dilated, rectum had normal appearance. 





Plan:





-follow up  on  HSV serology, RPR


-follow up on Influeza test


-agree with colonoscopy-


-continue ceftriaxone, D2


continue, flagyl, D2


continue, doxycycline, D2


continue valtrex


-continue [facility] 


M: [phone]


O:[phone]





Subjective


Date of service: 09/12/18


Interval history: 


Patient was sitting up in bed.  Patient stated that his throat was still 

hurting today, but he felt better otherwise.





Blood cultures:


9/10 ngtd





Urine cultures:


9/10 neg





 


Current Antimicrobials:


Zosyn 9/11


Doxycyline 9/11


flagyl - 9/11





Previous Antimicrobials:








Objective





- Exam


Narrative Exam: 





eneral appearance: Alert in NAD, conversant 


Eyes: anicteric sclerae, moist conjunctivae; no lid-lag; PERRLA 


HENT: Atraumatic; oropharynx + erthematous edematous tonsils and no mucosal 

ulcerations/no oral thrush; normal hard and soft palate. Normal external ears.


Neck: Trachea midline; supple, no thyromegaly or lymphadenopathy 


Lungs: CTA, with normal respiratory effort and no intercostal retractions 


CV: RRR, no murmurs


Abdomen: Soft, non-tender; no masses or hepatosplenomegaly


Extremities: No peripheral edema or extremity lymphadenopathy


Skin: Normal temperature, turgor and texture; no rash, ulcers or subcutaneous 

nodules 


Psych: Appropriate affect, alert and oriented to person, place and time. Neuro: 

alert and oriented x 3. Moving all extermities


Lines: No CVL / PICC








- Constitutional


Vitals: 


 Vital Signs











Temp Pulse Resp BP Pulse Ox


 


 98.4 F   88   16   107/71   97 


 


 09/12/18 05:42  09/12/18 05:42  09/12/18 05:42  09/12/18 05:42  09/12/18 05:42








 Temperature -Last 24 Hours











Temperature                    98.4 F


 


Temperature                    97.8 F


 


Temperature                    97.6 F


 


Temperature                    99.2 F

















- Labs


CBC & Chem 7: 


 09/12/18 05:50





 09/11/18 02:31


Labs: 


 Abnormal lab results











  09/11/18 09/12/18 Range/Units





  20:45 05:50 


 


WBC   3.8 L  (4.5-11.0)  K/mm3


 


RBC   3.59 L  (3.65-5.03)  M/mm3


 


Hgb   11.1 L  (11.8-15.2)  gm/dl


 


Hct   32.9 L  (35.5-45.6)  %


 


Seg Neuts % (Manual)   75.0 H  (40.0-70.0)  %


 


Lymphocytes % (Manual)   11.0 L  (13.4-35.0)  %


 


Monocytes % (Manual)   12.0 H  (0.0-7.3)  %


 


Lymphocytes # (Manual)   0.4 L  (1.2-5.4)  K/mm3


 


C-Reactive Protein  6.10 H   (0.00-1.30)  mg/dL

## 2018-09-12 NOTE — PROGRESS NOTE
Assessment and Plan





Pt feeling well without compl.  tarik liq diet





Abd soft, non tender





stable





awaiting colonoscopy and MRI as per GI





will follow prn.  call if MRI or endoscopy finding warrant


 Selected Entries











  09/12/18





  11:35


 


Temperature 98.7 F


 


Pulse Rate 80


 


Respiratory 18





Rate 


 


Blood Pressure 105/61








 Laboratory Tests











  09/12/18





  05:50


 


WBC  3.8 L


 


Hgb  11.1 L


 


Hct  32.9 L














Objective


 Vital Signs - 12hr











  09/12/18 09/12/18





  05:42 11:35


 


Temperature 98.4 F 98.7 F


 


Pulse Rate 88 80


 


Respiratory 16 18





Rate  


 


Blood Pressure 107/71 105/61


 


O2 Sat by Pulse 97 97





Oximetry  














- Labs





 09/12/18 05:50





 09/11/18 02:31

## 2018-09-12 NOTE — PROGRESS NOTE
Assessment and Plan


Assessment and plan: 


Abdominal pain.


Etiology unclear


For MRI Abdomen today to further evaluate


Appendicitis. 


surgeon consulted





Bright red blood per rectum.


Monitor H/H


GI following





HIV infection.


ID Physician following





Fever.


Blood cultures ordered





Full code status














History


Interval history: 


Abdominal pain,


Bright red blood per rectum


Fever








Hospitalist Physical





- Physical exam


Narrative exam: 


GEN:Not in acute distress, lying in bed


HEENT: Normocephalic, atraumatic,


Neck: supple, No JVD


Lungs: Clear to auscultaion bilaterally, no crackles 


Heart:S1 and S2 reg, no murmurs, rubs or gallop


Abd:soft, non-tender, non-distended, Normal bowel sounds


Ext: No edema, clubbing or cyanosis


Neuro: Awake, alert, oriented X 3, Moves all extremities








- Constitutional


Vitals: 


 











Temp Pulse Resp BP Pulse Ox


 


 98.4 F   88   16   107/71   97 


 


 09/12/18 05:42  09/12/18 05:42  09/12/18 05:42  09/12/18 05:42  09/12/18 05:42














Results





- Labs


CBC & Chem 7: 


 09/12/18 05:50





 09/11/18 02:31


Labs: 


 Laboratory Last Values











WBC  3.8 K/mm3 (4.5-11.0)  L  09/12/18  05:50    


 


RBC  3.59 M/mm3 (3.65-5.03)  L  09/12/18  05:50    


 


Hgb  11.1 gm/dl (11.8-15.2)  L  09/12/18  05:50    


 


Hct  32.9 % (35.5-45.6)  L  09/12/18  05:50    


 


MCV  92 fl (84-94)   09/12/18  05:50    


 


MCH  31 pg (28-32)   09/12/18  05:50    


 


MCHC  34 % (32-34)   09/12/18  05:50    


 


RDW  15.0 % (13.2-15.2)   09/12/18  05:50    


 


Plt Count  176 K/mm3 (140-440)   09/12/18  05:50    


 


Lymph % (Auto)  16.5 % (13.4-35.0)   09/11/18  02:31    


 


Mono % (Auto)  Np   09/12/18  05:50    


 


Eos % (Auto)  0.1 % (0.0-4.3)   09/11/18  02:31    


 


Baso % (Auto)  0.3 % (0.0-1.8)   09/11/18  02:31    


 


Lymph #  1.3 K/mm3 (1.2-5.4)   09/11/18  02:31    


 


Mono #  1.1 K/mm3 (0.0-0.8)  H  09/11/18  02:31    


 


Eos #  0.0 K/mm3 (0.0-0.4)   09/11/18  02:31    


 


Baso #  0.0 K/mm3 (0.0-0.1)   09/11/18  02:31    


 


Add Manual Diff  Complete   09/12/18  05:50    


 


Total Counted  100   09/12/18  05:50    


 


Seg Neutrophils %  69.4 % (40.0-70.0)   09/11/18  02:31    


 


Seg Neuts % (Manual)  75.0 % (40.0-70.0)  H  09/12/18  05:50    


 


Band Neutrophils %  0 %  09/12/18  05:50    


 


Lymphocytes % (Manual)  11.0 % (13.4-35.0)  L  09/12/18  05:50    


 


Reactive Lymphs % (Man)  1.0 %  09/12/18  05:50    


 


Monocytes % (Manual)  12.0 % (0.0-7.3)  H  09/12/18  05:50    


 


Eosinophils % (Manual)  1.0 % (0.0-4.3)   09/12/18  05:50    


 


Basophils % (Manual)  0 % (0.0-1.8)   09/12/18  05:50    


 


Metamyelocytes %  0 %  09/12/18  05:50    


 


Myelocytes %  0 %  09/12/18  05:50    


 


Promyelocytes %  0 %  09/12/18  05:50    


 


Blast Cells %  0 %  09/12/18  05:50    


 


Nucleated RBC %  Not Reportable   09/12/18  05:50    


 


Seg Neutrophils #  5.4 K/mm3 (1.8-7.7)   09/11/18  02:31    


 


Seg Neutrophils # Man  2.9 K/mm3 (1.8-7.7)   09/12/18  05:50    


 


Band Neutrophils #  0.0 K/mm3  09/12/18  05:50    


 


Lymphocytes # (Manual)  0.4 K/mm3 (1.2-5.4)  L  09/12/18  05:50    


 


Abs React Lymphs (Man)  0.0 K/mm3  09/12/18  05:50    


 


Monocytes # (Manual)  0.5 K/mm3 (0.0-0.8)   09/12/18  05:50    


 


Eosinophils # (Manual)  0.0 K/mm3 (0.0-0.4)   09/12/18  05:50    


 


Basophils # (Manual)  0.0 K/mm3 (0.0-0.1)   09/12/18  05:50    


 


Metamyelocytes #  0.0 K/mm3  09/12/18  05:50    


 


Myelocytes #  0.0 K/mm3  09/12/18  05:50    


 


Promyelocytes #  0.0 K/mm3  09/12/18  05:50    


 


Blast Cells #  0.0 K/mm3  09/12/18  05:50    


 


WBC Morphology  Not Reportable   09/12/18  05:50    


 


Hypersegmented Neuts  Not Reportable   09/12/18  05:50    


 


Hyposegmented Neuts  Not Reportable   09/12/18  05:50    


 


Hypogranular Neuts  Not Reportable   09/12/18  05:50    


 


Smudge Cells  Not Reportable   09/12/18  05:50    


 


Toxic Granulation  Not Reportable   09/12/18  05:50    


 


Toxic Vacuolation  Not Reportable   09/12/18  05:50    


 


Dohle Bodies  Not Reportable   09/12/18  05:50    


 


Pelger-Huet Anomaly  Not Reportable   09/12/18  05:50    


 


Miguel A Rods  Not Reportable   09/12/18  05:50    


 


Platelet Estimate  Cons   09/12/18  05:50    


 


Clumped Platelets  Not Reportable   09/12/18  05:50    


 


Plt Clumps, EDTA  Not Reportable   09/12/18  05:50    


 


Large Platelets  Not Reportable   09/12/18  05:50    


 


Giant Platelets  Not Reportable   09/12/18  05:50    


 


Platelet Satelliting  Not Reportable   09/12/18  05:50    


 


Plt Morphology Comment  Not Reportable   09/12/18  05:50    


 


RBC Morphology  Not Reportable   09/12/18  05:50    


 


Dimorphic RBCs  Not Reportable   09/12/18  05:50    


 


Polychromasia  Not Reportable   09/12/18  05:50    


 


Hypochromasia  Not Reportable   09/12/18  05:50    


 


Poikilocytosis  Not Reportable   09/12/18  05:50    


 


Anisocytosis  1+   09/12/18  05:50    


 


Microcytosis  Not Reportable   09/12/18  05:50    


 


Macrocytosis  Not Reportable   09/12/18  05:50    


 


Spherocytes  Not Reportable   09/12/18  05:50    


 


Pappenheimer Bodies  Not Reportable   09/12/18  05:50    


 


Sickle Cells  Not Reportable   09/12/18  05:50    


 


Target Cells  Not Reportable   09/12/18  05:50    


 


Tear Drop Cells  Not Reportable   09/12/18  05:50    


 


Ovalocytes  Not Reportable   09/12/18  05:50    


 


Helmet Cells  Not Reportable   09/12/18  05:50    


 


[place] Bodies  Not Reportable   09/12/18  05:50    


 


Cabot Rings  Not Reportable   09/12/18  05:50    


 


[place] Cells  Not Reportable   09/12/18  05:50    


 


Bite Cells  Not Reportable   09/12/18  05:50    


 


Crenated Cell  Not Reportable   09/12/18  05:50    


 


Elliptocytes  Not Reportable   09/12/18  05:50    


 


Acanthocytes (Spur)  Not Reportable   09/12/18  05:50    


 


Rouleaux  Not Reportable   09/12/18  05:50    


 


Hemoglobin C Crystals  Not Reportable   09/12/18  05:50    


 


Schistocytes  Not Reportable   09/12/18  05:50    


 


Malaria parasites  Not Reportable   09/12/18  05:50    


 


Escobar Bodies  Not Reportable   09/12/18  05:50    


 


Hem Pathologist Commnt  No   09/12/18  05:50    


 


PT  15.3 Sec. (12.2-14.9)  H  09/10/18  16:20    


 


INR  1.15  (0.87-1.13)  H  09/10/18  16:20    


 


APTT  35.1 Sec. (24.2-36.6)   09/10/18  15:07    


 


VBG pH  7.340  (7.320-7.420)   09/10/18  16:20    


 


Sodium  139 mmol/L (137-145)   09/11/18  02:31    


 


Potassium  4.1 mmol/L (3.6-5.0)   09/11/18  02:31    


 


Chloride  103.8 mmol/L ()   09/11/18  02:31    


 


Carbon Dioxide  24 mmol/L (22-30)   09/11/18  02:31    


 


Anion Gap  15 mmol/L  09/11/18  02:31    


 


BUN  8 mg/dL (9-20)  L  09/11/18  02:31    


 


Creatinine  1.0 mg/dL (0.8-1.5)   09/11/18  02:31    


 


Estimated GFR  > 60 ml/min  09/11/18  02:31    


 


BUN/Creatinine Ratio  8 %  09/11/18  02:31    


 


Glucose  100 mg/dL ()   09/11/18  02:31    


 


Lactic Acid  1.40 mmol/L (0.7-2.0)   09/10/18  20:02    


 


Calcium  8.5 mg/dL (8.4-10.2)   09/11/18  02:31    


 


Magnesium  1.90 mg/dL (1.7-2.3)   09/10/18  16:20    


 


Total Bilirubin  0.60 mg/dL (0.1-1.2)   09/10/18  15:07    


 


AST  24 units/L (5-40)   09/10/18  15:07    


 


ALT  13 units/L (7-56)   09/10/18  15:07    


 


Alkaline Phosphatase  75 units/L ()   09/10/18  15:07    


 


C-Reactive Protein  6.10 mg/dL (0.00-1.30)  H  09/11/18  20:45    


 


Total Protein  9.2 g/dL (6.3-8.2)  H  09/10/18  15:07    


 


Albumin  4.4 g/dL (3.9-5)   09/10/18  15:07    


 


Albumin/Globulin Ratio  0.9 %  09/10/18  15:07    


 


Lipase  14 units/L (13-60)   09/10/18  15:07    


 


Urine Color  Yellow  (Yellow)   09/10/18  19:45    


 


Urine Turbidity  Clear  (Clear)   09/10/18  19:45    


 


Urine pH  5.0  (5.0-7.0)   09/10/18  19:45    


 


Ur Specific Gravity  1.026  (1.003-1.030)   09/10/18  19:45    


 


Urine Protein  <15 mg/dl mg/dL (Negative)   09/10/18  19:45    


 


Urine Glucose (UA)  Neg mg/dL (Negative)   09/10/18  19:45    


 


Urine Ketones  Neg mg/dL (Negative)   09/10/18  19:45    


 


Urine Blood  Mod  (Negative)   09/10/18  19:45    


 


Urine Nitrite  Neg  (Negative)   09/10/18  19:45    


 


Urine Bilirubin  Neg  (Negative)   09/10/18  19:45    


 


Urine Urobilinogen  < 2.0 mg/dL (<2.0)   09/10/18  19:45    


 


Ur Leukocyte Esterase  Neg  (Negative)   09/10/18  19:45    


 


Urine WBC (Auto)  2.0 /HPF (0.0-6.0)   09/10/18  19:45    


 


Urine RBC (Auto)  50.0 /HPF (0.0-6.0)   09/10/18  19:45    


 


U Epithel Cells (Auto)  1.0 /HPF (0-13.0)   09/10/18  19:45    


 


Urine Mucus  1+ /HPF  09/10/18  19:45    


 


Group A Strep Rapid  Negative  (Negative)   09/11/18  Unknown


 


Blood Type  O NEGATIVE   09/10/18  15:07    


 


Antibody Screen  Negative   09/10/18  15:07

## 2018-09-12 NOTE — CONSULTATION
REASON FOR CONSULTATION:  Rectal bleeding.



HISTORY OF PRESENT ILLNESS:  The patient is a 23-year-old gentleman who was

admitted to the hospital with chief complaint of sweating, sore throat,

headache, and fever.  Denies any nausea or vomiting.  The patient also states he

has noted some recent rectal bleeding.  The patient is HIV positive and does

admit to active rectal intercourse.



PAST MEDICAL HISTORY:  Pertinent for HIV.



PAST SURGICAL HISTORY:  Negative.



ALLERGIES:  No known allergies.



MEDICATIONS:  HIV meds.



FAMILY HISTORY:  Negative.



SOCIAL HISTORY:  Occasional ethanol intake.  Denies any smoking.



PHYSICAL EXAMINATION:

GENERAL:  At this time reveals this gentleman to be awake, alert, cooperative,

in no acute distress.

VITAL SIGNS:  Shown him to be running a low grade temperature of 99.2, blood

pressure is 91/50, pulse 118, respirations of 20.

ABDOMEN:  Examination of the abdomen reveals to be soft and nontender at

present.

RECTAL:  Has been deferred because the patient states he has no rectal pain and

he has already had two rectal.



LABORATORY DATA:  A recent blood work includes a CBC which shows a white count

of 7.8, H and H is 12.1 and 35.9, which is stable from a prior 11.9 and 35. 

Electrolytes were essentially within normal limits.  Urinalysis is clear and no

real white cells. 



RADIOLOGICAL DATA:  A CT scan of the abdomen was performed that I have reviewed

with the radiologist.  Original reading by the [facility] personnel could not

rule out appendicitis, but in review with Dr. Chavez today, there is no evidence

of any periappendiceal inflammation and no real evidence of appendicitis.  The

patient's abdomen is soft and there is no right lower quadrant tenderness at

this time.  Also, there is no real evidence for any intra-abdominal source of

the patient's sepsis.  Rectum also appears normal.  No evidence of any fluid

collections or abscesses.



ASSESSMENT AND PLAN:  At this time is that of a 23-year-old HIV-positive

patient, rule out HIV as a source of his sweating, fevers, etc.  Need to

continue workup for primary source of any other infection.  Also, would

recommend GI evaluation and colonoscopy.  We will follow with you pending

findings of colonoscopy.





DD: 09/11/2018 18:53

DT: 09/12/2018 04:45

JOB# [id number]  [id number]

FP/NTS

## 2018-09-13 LAB
HCT VFR BLD CALC: 34.9 % (ref 35.5–45.6)
HGB BLD-MCNC: 11.9 GM/DL (ref 11.8–15.2)

## 2018-09-13 RX ADMIN — METRONIDAZOLE SCH MLS/HR: 5 INJECTION, SOLUTION INTRAVENOUS at 22:12

## 2018-09-13 RX ADMIN — DOXYCYCLINE SCH MLS/HR: 100 INJECTION, POWDER, LYOPHILIZED, FOR SOLUTION INTRAVENOUS at 10:50

## 2018-09-13 RX ADMIN — VALACYCLOVIR HYDROCHLORIDE SCH MG: 500 TABLET, FILM COATED ORAL at 18:22

## 2018-09-13 RX ADMIN — VALACYCLOVIR HYDROCHLORIDE SCH MG: 500 TABLET, FILM COATED ORAL at 22:12

## 2018-09-13 RX ADMIN — CEFTRIAXONE SODIUM SCH MLS/HR: 2 INJECTION, POWDER, FOR SOLUTION INTRAMUSCULAR; INTRAVENOUS at 10:50

## 2018-09-13 RX ADMIN — Medication SCH ML: at 22:12

## 2018-09-13 RX ADMIN — METRONIDAZOLE SCH MLS/HR: 5 INJECTION, SOLUTION INTRAVENOUS at 06:08

## 2018-09-13 RX ADMIN — VALACYCLOVIR HYDROCHLORIDE SCH MG: 500 TABLET, FILM COATED ORAL at 10:49

## 2018-09-13 RX ADMIN — VALACYCLOVIR HYDROCHLORIDE SCH MG: 500 TABLET, FILM COATED ORAL at 06:09

## 2018-09-13 RX ADMIN — METRONIDAZOLE SCH MLS/HR: 5 INJECTION, SOLUTION INTRAVENOUS at 18:21

## 2018-09-13 RX ADMIN — Medication SCH ML: at 10:51

## 2018-09-13 RX ADMIN — DOXYCYCLINE SCH MLS/HR: 100 INJECTION, POWDER, LYOPHILIZED, FOR SOLUTION INTRAVENOUS at 22:12

## 2018-09-13 NOTE — MAGNETIC RESONANCE REPORT
FINAL REPORT



PROCEDURE:  MR PELVIS WO CON



TECHNIQUE:  Magnetic resonance imaging of the pelvis was

performed using standard sequences. CPT 74129







HISTORY:  rectal pain, abscess? 



COMPARISON:  No prior studies are available for comparison.



FINDINGS:  

Prostate gland: Normal.



Seminal vesicles: Normal.



Bladder: Normal.



Pelvic musculature: Normal.



Free fluid: Minimal degree free fluid is noted in the pelvic

cavity



Adenopathy or mass: Multiple nonenlarged lymph nodes are noted in

bilateral superficial inguinal regions.



There is no evidence of any fluid collection in the pelvis. 



IMPRESSION:  

No evidence of acute abnormality as visualized on this

noncontrast study.



No evidence of abscess formation



Minimal free fluid is noted in the pelvic cavity.

## 2018-09-13 NOTE — GASTROENTEROLOGY PROGRESS NOTE
Assessment and Plan





- Patient Problems


(1) Anorectal pain


Current Visit: Yes   Status: Acute   


Plan to address problem: 


Symptoms have been mild and are essentially resolved.  The patient had 

colonoscopy in 2017.  No strong clinical suspicion of abscess, but likely has 

minor fissure or trauma. Will not plan endoscopic study in light of resolving 

symptoms.  Not sure when MRI will be done.  I will s/o at this point.  Please 

re consult if I can be of assistance.








(2) Rectal bleeding


Current Visit: Yes   Status: Acute   





Subjective


Date of service: 09/13/18


Principal diagnosis: minor rectal bleeding and anorectal pain


Interval history: 





The patient reports no bleeding and minimal to no anal discomfort. 





Objective





- Constitutional


Vitals: 


 











Temp Pulse Resp BP Pulse Ox


 


 98.5 F   83   18   111/71   99 


 


 09/13/18 11:58  09/13/18 11:58  09/13/18 11:58  09/13/18 11:58  09/13/18 11:58











General appearance: no acute distress





- EENT


ENT: hearing intact, clear oral mucosa, dentition normal





- Respiratory


Respiratory effort: normal


Respiratory: bilateral: CTA





- Cardiovascular


Rhythm: regular





- Extremities


Extremities: pulses intact, No edema, normal color, Full ROM





- Gastrointestinal


General gastrointestinal: Present: soft, non-tender, non-distended, normal 

bowel sounds





- Neurologic


Neurological: alert and oriented x3





- Labs


CBC & Chem 7: 


 09/13/18 10:07





 09/11/18 02:31


Labs: 


 Laboratory Results - last 24 hr











  09/13/18





  10:07


 


Hgb  11.9


 


Hct  34.9 L

## 2018-09-13 NOTE — PROGRESS NOTE
Assessment and Plan


Assessment and plan: 


Abdominal pain.


Etiology unclear


For MRI Abdomen today to further evaluate


Appendicitis ruled ou





Bright red blood per rectum.


Monitor H/H


GI following





HIV infection.


ID Physician following





Fever.


Blood cultures ordered, no growth





Full code status














History


Interval history: 


Abdominal pain,


Bright red blood per rectum


Fever resolved. No fever  X 3 days








Hospitalist Physical





- Physical exam


Narrative exam: 


GEN:Not in acute distress, lying in bed


HEENT: Normocephalic, atraumatic,


Neck: supple, No JVD


Lungs: Clear to auscultaion bilaterally, no crackles 


Heart:S1 and S2 reg, no murmurs, rubs or gallop


Abd:soft, non-tender, non-distended, Normal bowel sounds


Ext: No edema, clubbing or cyanosis


Neuro: Awake, alert, oriented X 3, Moves all extremities








- Constitutional


Vitals: 


 











Temp Pulse Resp BP Pulse Ox


 


 98.5 F   83   18   111/71   99 


 


 09/13/18 11:58  09/13/18 11:58  09/13/18 11:58  09/13/18 11:58  09/13/18 11:58














Results





- Labs


CBC & Chem 7: 


 09/13/18 10:07





 09/11/18 02:31


Labs: 


 Laboratory Last Values











WBC  3.8 K/mm3 (4.5-11.0)  L  09/12/18  05:50    


 


RBC  3.59 M/mm3 (3.65-5.03)  L  09/12/18  05:50    


 


Hgb  11.9 gm/dl (11.8-15.2)   09/13/18  10:07    


 


Hct  34.9 % (35.5-45.6)  L  09/13/18  10:07    


 


MCV  92 fl (84-94)   09/12/18  05:50    


 


MCH  31 pg (28-32)   09/12/18  05:50    


 


MCHC  34 % (32-34)   09/12/18  05:50    


 


RDW  15.0 % (13.2-15.2)   09/12/18  05:50    


 


Plt Count  176 K/mm3 (140-440)   09/12/18  05:50    


 


Lymph % (Auto)  16.5 % (13.4-35.0)   09/11/18  02:31    


 


Mono % (Auto)  Np   09/12/18  05:50    


 


Eos % (Auto)  0.1 % (0.0-4.3)   09/11/18  02:31    


 


Baso % (Auto)  0.3 % (0.0-1.8)   09/11/18  02:31    


 


Lymph #  1.3 K/mm3 (1.2-5.4)   09/11/18  02:31    


 


Mono #  1.1 K/mm3 (0.0-0.8)  H  09/11/18  02:31    


 


Eos #  0.0 K/mm3 (0.0-0.4)   09/11/18  02:31    


 


Baso #  0.0 K/mm3 (0.0-0.1)   09/11/18  02:31    


 


Add Manual Diff  Complete   09/12/18  05:50    


 


Total Counted  100   09/12/18  05:50    


 


Seg Neutrophils %  69.4 % (40.0-70.0)   09/11/18  02:31    


 


Seg Neuts % (Manual)  75.0 % (40.0-70.0)  H  09/12/18  05:50    


 


Band Neutrophils %  0 %  09/12/18  05:50    


 


Lymphocytes % (Manual)  11.0 % (13.4-35.0)  L  09/12/18  05:50    


 


Reactive Lymphs % (Man)  1.0 %  09/12/18  05:50    


 


Monocytes % (Manual)  12.0 % (0.0-7.3)  H  09/12/18  05:50    


 


Eosinophils % (Manual)  1.0 % (0.0-4.3)   09/12/18  05:50    


 


Basophils % (Manual)  0 % (0.0-1.8)   09/12/18  05:50    


 


Metamyelocytes %  0 %  09/12/18  05:50    


 


Myelocytes %  0 %  09/12/18  05:50    


 


Promyelocytes %  0 %  09/12/18  05:50    


 


Blast Cells %  0 %  09/12/18  05:50    


 


Nucleated RBC %  Not Reportable   09/12/18  05:50    


 


Seg Neutrophils #  5.4 K/mm3 (1.8-7.7)   09/11/18  02:31    


 


Seg Neutrophils # Man  2.9 K/mm3 (1.8-7.7)   09/12/18  05:50    


 


Band Neutrophils #  0.0 K/mm3  09/12/18  05:50    


 


Lymphocytes # (Manual)  0.4 K/mm3 (1.2-5.4)  L  09/12/18  05:50    


 


Abs React Lymphs (Man)  0.0 K/mm3  09/12/18  05:50    


 


Monocytes # (Manual)  0.5 K/mm3 (0.0-0.8)   09/12/18  05:50    


 


Eosinophils # (Manual)  0.0 K/mm3 (0.0-0.4)   09/12/18  05:50    


 


Basophils # (Manual)  0.0 K/mm3 (0.0-0.1)   09/12/18  05:50    


 


Metamyelocytes #  0.0 K/mm3  09/12/18  05:50    


 


Myelocytes #  0.0 K/mm3  09/12/18  05:50    


 


Promyelocytes #  0.0 K/mm3  09/12/18  05:50    


 


Blast Cells #  0.0 K/mm3  09/12/18  05:50    


 


WBC Morphology  Not Reportable   09/12/18  05:50    


 


Hypersegmented Neuts  Not Reportable   09/12/18  05:50    


 


Hyposegmented Neuts  Not Reportable   09/12/18  05:50    


 


Hypogranular Neuts  Not Reportable   09/12/18  05:50    


 


Smudge Cells  Not Reportable   09/12/18  05:50    


 


Toxic Granulation  Not Reportable   09/12/18  05:50    


 


Toxic Vacuolation  Not Reportable   09/12/18  05:50    


 


Dohle Bodies  Not Reportable   09/12/18  05:50    


 


Pelger-Huet Anomaly  Not Reportable   09/12/18  05:50    


 


Miguel A Rods  Not Reportable   09/12/18  05:50    


 


Platelet Estimate  Cons   09/12/18  05:50    


 


Clumped Platelets  Not Reportable   09/12/18  05:50    


 


Plt Clumps, EDTA  Not Reportable   09/12/18  05:50    


 


Large Platelets  Not Reportable   09/12/18  05:50    


 


Giant Platelets  Not Reportable   09/12/18  05:50    


 


Platelet Satelliting  Not Reportable   09/12/18  05:50    


 


Plt Morphology Comment  Not Reportable   09/12/18  05:50    


 


RBC Morphology  Not Reportable   09/12/18  05:50    


 


Dimorphic RBCs  Not Reportable   09/12/18  05:50    


 


Polychromasia  Not Reportable   09/12/18  05:50    


 


Hypochromasia  Not Reportable   09/12/18  05:50    


 


Poikilocytosis  Not Reportable   09/12/18  05:50    


 


Anisocytosis  1+   09/12/18  05:50    


 


Microcytosis  Not Reportable   09/12/18  05:50    


 


Macrocytosis  Not Reportable   09/12/18  05:50    


 


Spherocytes  Not Reportable   09/12/18  05:50    


 


Pappenheimer Bodies  Not Reportable   09/12/18  05:50    


 


Sickle Cells  Not Reportable   09/12/18  05:50    


 


Target Cells  Not Reportable   09/12/18  05:50    


 


Tear Drop Cells  Not Reportable   09/12/18  05:50    


 


Ovalocytes  Not Reportable   09/12/18  05:50    


 


Helmet Cells  Not Reportable   09/12/18  05:50    


 


[place] Bodies  Not Reportable   09/12/18  05:50    


 


Cabot Rings  Not Reportable   09/12/18  05:50    


 


[place] Cells  Not Reportable   09/12/18  05:50    


 


Bite Cells  Not Reportable   09/12/18  05:50    


 


Crenated Cell  Not Reportable   09/12/18  05:50    


 


Elliptocytes  Not Reportable   09/12/18  05:50    


 


Acanthocytes (Spur)  Not Reportable   09/12/18  05:50    


 


Rouleaux  Not Reportable   09/12/18  05:50    


 


Hemoglobin C Crystals  Not Reportable   09/12/18  05:50    


 


Schistocytes  Not Reportable   09/12/18  05:50    


 


Malaria parasites  Not Reportable   09/12/18  05:50    


 


Escobar Bodies  Not Reportable   09/12/18  05:50    


 


Hem Pathologist Commnt  No   09/12/18  05:50    


 


PT  15.3 Sec. (12.2-14.9)  H  09/10/18  16:20    


 


INR  1.15  (0.87-1.13)  H  09/10/18  16:20    


 


APTT  35.1 Sec. (24.2-36.6)   09/10/18  15:07    


 


VBG pH  7.340  (7.320-7.420)   09/10/18  16:20    


 


Sodium  139 mmol/L (137-145)   09/11/18  02:31    


 


Potassium  4.1 mmol/L (3.6-5.0)   09/11/18  02:31    


 


Chloride  103.8 mmol/L ()   09/11/18  02:31    


 


Carbon Dioxide  24 mmol/L (22-30)   09/11/18  02:31    


 


Anion Gap  15 mmol/L  09/11/18  02:31    


 


BUN  8 mg/dL (9-20)  L  09/11/18  02:31    


 


Creatinine  1.0 mg/dL (0.8-1.5)   09/11/18  02:31    


 


Estimated GFR  > 60 ml/min  09/11/18  02:31    


 


BUN/Creatinine Ratio  8 %  09/11/18  02:31    


 


Glucose  100 mg/dL ()   09/11/18  02:31    


 


Lactic Acid  1.40 mmol/L (0.7-2.0)   09/10/18  20:02    


 


Calcium  8.5 mg/dL (8.4-10.2)   09/11/18  02:31    


 


Magnesium  1.90 mg/dL (1.7-2.3)   09/10/18  16:20    


 


Total Bilirubin  0.60 mg/dL (0.1-1.2)   09/10/18  15:07    


 


AST  24 units/L (5-40)   09/10/18  15:07    


 


ALT  13 units/L (7-56)   09/10/18  15:07    


 


Alkaline Phosphatase  75 units/L ()   09/10/18  15:07    


 


C-Reactive Protein  6.10 mg/dL (0.00-1.30)  H  09/11/18  20:45    


 


Total Protein  9.2 g/dL (6.3-8.2)  H  09/10/18  15:07    


 


Albumin  4.4 g/dL (3.9-5)   09/10/18  15:07    


 


Albumin/Globulin Ratio  0.9 %  09/10/18  15:07    


 


Lipase  14 units/L (13-60)   09/10/18  15:07    


 


Urine Color  Yellow  (Yellow)   09/10/18  19:45    


 


Urine Turbidity  Clear  (Clear)   09/10/18  19:45    


 


Urine pH  5.0  (5.0-7.0)   09/10/18  19:45    


 


Ur Specific Gravity  1.026  (1.003-1.030)   09/10/18  19:45    


 


Urine Protein  <15 mg/dl mg/dL (Negative)   09/10/18  19:45    


 


Urine Glucose (UA)  Neg mg/dL (Negative)   09/10/18  19:45    


 


Urine Ketones  Neg mg/dL (Negative)   09/10/18  19:45    


 


Urine Blood  Mod  (Negative)   09/10/18  19:45    


 


Urine Nitrite  Neg  (Negative)   09/10/18  19:45    


 


Urine Bilirubin  Neg  (Negative)   09/10/18  19:45    


 


Urine Urobilinogen  < 2.0 mg/dL (<2.0)   09/10/18  19:45    


 


Ur Leukocyte Esterase  Neg  (Negative)   09/10/18  19:45    


 


Urine WBC (Auto)  2.0 /HPF (0.0-6.0)   09/10/18  19:45    


 


Urine RBC (Auto)  50.0 /HPF (0.0-6.0)   09/10/18  19:45    


 


U Epithel Cells (Auto)  1.0 /HPF (0-13.0)   09/10/18  19:45    


 


Urine Mucus  1+ /HPF  09/10/18  19:45    


 


RPR  Nonreactive  (Nonreactive)   09/11/18  20:45    


 


Group A Strep Rapid  Negative  (Negative)   09/11/18  Unknown


 


Blood Type  O NEGATIVE   09/10/18  15:07    


 


Antibody Screen  Negative   09/10/18  15:07

## 2018-09-13 NOTE — PROGRESS NOTE
Assessment and Plan





Assessment: 


1) SIRS: Improving.  Etiology unclear. DDX:, influenza, proctitis/rectal abscess

/STD.. Doubt opportunistic infection.


               - Rapid Strep test negative


               -RPR- negative


                - Blood culture - ngtd


                -Urine culture - ngtd


                 -Chest xray - neg


 2) HIV infection: since 2013, last LV5=486, VL undetectable in June 2018, his 

HIV provider is in [place]. Takes complera. He moved to [place] 1 y and 1/2 ago 

but he has not established HIV care here. He went to [place] in June and saw 

his PCP


              -CRP - 6.1. 


3) Rectal pain/bleeding: DDx: proctitis/rectal trauma infected/abscess/STD (HSV/

syphilis/HSV/Chlamydia/LGV). He is MSM. He reported to one of the providers 

symptoms developed after anal intercourse last week, however he reported to me 

that last anal intercouse was 2 months ago. Of note, last colonoscopy was in 

[place] in 2017 which revealed a possible abscess that required draining per 

patient.  


   -Occult blood positive. 


   -CT showed appendix mildly dilated, rectum had normal appearance


             - The patient had a colonoscopy in 2017. Per GI, "no strong 

clinical suspicion of abscess, but likely has minor fissure or trauma".  

Edndoscopic study not done in light of resolving symptoms.. 


Plan:





-follow up  on  HSV serology


-follow up on Influeza test


-continue ceftriaxone, D2


continue, flagyl, D3


continue, doxycycline, D3


continue valtrex


-continue complera


Upon discharge will treat with Doxycycline 100 mg po, 2x a day for a total of 

21 days ending 10-1-18 and Augmentin 875 mg, po every 12 hours for a total of 

14 days ending 9-25-18 for proctitis. Will follow up in the office 10-4-18.








[facility] 


M: [phone]


O:[phone]





Subjective


Date of service: 09/13/18


Principal diagnosis: minor rectal bleeding and anorectal pain


Interval history: 


Patient was sitting up in bed.  Patient stated that he was feeling much better 

today. Patient stated that he was anxious to go home..





Blood cultures:


9/10 ngtd





Urine cultures:


9/10 neg





 


Current Antimicrobials:


ceftriaxone 9/12


Doxycyline 9/11


flagyl - 9/11





Previous Antimicrobials:








Objective





- Exam


Narrative Exam: 





eneral appearance: Alert in NAD, conversant 


Eyes: anicteric sclerae, moist conjunctivae; no lid-lag; PERRLA 


HENT: Atraumatic; oropharynx + erthematous edematous tonsils and no mucosal 

ulcerations/no oral thrush; normal hard and soft palate. Normal external ears.


Neck: Trachea midline; supple, no thyromegaly or lymphadenopathy 


Lungs: CTA, with normal respiratory effort and no intercostal retractions 


CV: RRR, no murmurs


Abdomen: Soft, non-tender; no masses or hepatosplenomegaly


Extremities: No peripheral edema or extremity lymphadenopathy


Skin: Normal temperature, turgor and texture; no rash, ulcers or subcutaneous 

nodules 


Psych: Appropriate affect, alert and oriented to person, place and time. Neuro: 

alert and oriented x 3. Moving all extermities


Lines: No CVL / PICC








- Constitutional


Vitals: 


 Vital Signs











Temp Pulse Resp BP Pulse Ox


 


 98.5 F   83   18   111/71   99 


 


 09/13/18 11:58  09/13/18 11:58  09/13/18 11:58  09/13/18 11:58  09/13/18 11:58








 Temperature -Last 24 Hours











Temperature                    98.5 F


 


Temperature                    97.4 F


 


Temperature                    98.6 F


 


Temperature                    98.7 F

















- Labs


CBC & Chem 7: 


 09/13/18 10:07





 09/11/18 02:31


Labs: 


 Abnormal lab results











  09/13/18 Range/Units





  10:07 


 


Hct  34.9 L  (35.5-45.6)  %

## 2018-09-14 VITALS — SYSTOLIC BLOOD PRESSURE: 96 MMHG | DIASTOLIC BLOOD PRESSURE: 64 MMHG

## 2018-09-14 RX ADMIN — VALACYCLOVIR HYDROCHLORIDE SCH MG: 500 TABLET, FILM COATED ORAL at 08:00

## 2018-09-14 RX ADMIN — CEFTRIAXONE SODIUM SCH MLS/HR: 2 INJECTION, POWDER, FOR SOLUTION INTRAMUSCULAR; INTRAVENOUS at 09:42

## 2018-09-14 RX ADMIN — Medication SCH ML: at 09:43

## 2018-09-14 RX ADMIN — METRONIDAZOLE SCH MLS/HR: 5 INJECTION, SOLUTION INTRAVENOUS at 06:22

## 2018-09-14 RX ADMIN — SODIUM CHLORIDE SCH MLS/HR: 0.9 INJECTION, SOLUTION INTRAVENOUS at 06:29

## 2018-09-14 RX ADMIN — DOXYCYCLINE SCH MLS/HR: 100 INJECTION, POWDER, LYOPHILIZED, FOR SOLUTION INTRAVENOUS at 11:18

## 2018-09-14 NOTE — DISCHARGE SUMMARY
Providers





- Providers


Date of Admission: 


09/10/18 23:27





Date of discharge: 09/14/18


Attending physician: 


SALVADOR CASTILLO





 





09/10/18 22:04


Consult to Physician [CONS] Urgent 


   Comment: 


   Consulting Provider: SUJATHA NGO


   Physician Instructions: 


   Reason For Exam: ct possible appy





09/11/18 02:12


Consult to Physician [CONS] Routine 


   Comment: 


   Consulting Provider: CORWIN VELÁSQUEZ


   Physician Instructions: 


   Reason For Exam: BPR





09/11/18 09:41


Consult to Physician [CONS] Routine 


   Comment: 


   Consulting Provider: NAE NEGRETE


   Physician Instructions: consult ID


   Reason For Exam: HIV, fever











Primary care physician: 


PRIMARY CARE MD








Hospitalization


Condition: Fair


Disposition: DC-01 TO HOME OR SELFCARE





Core Measure Documentation





- Palliative Care


Palliative Care/ Comfort Measures: Not Applicable





- Core Measures


Any of the following diagnoses?: none





Exam





- Constitutional


Vitals: 


 











Temp Pulse Resp BP Pulse Ox


 


 98.2 F   74   18   101/67   99 


 


 09/14/18 05:27  09/14/18 05:27  09/14/18 05:27  09/14/18 05:27 09/14/18 05:27














Plan


Activity: no restrictions


Diet: regular


Additional Instructions: 1.Follow up with PCP in 1 week.  2.Follow up with Dr. Jacobs, ID Physician on 10/4/18.


Follow up with: 


PRIMARY CARE,MD [Primary Care Provider] - 3-5 Days


Forms:  Work/School Release Form


Prescriptions: 


Amoxicillin/K Clav Tab [Augmentin 875 mg] 1 tab PO Q12HR 12 Days #24 tab


Doxycycline [Vibramycin CAP] 100 mg PO Q12HR 18 Days #36 capsule

## 2018-09-14 NOTE — PROGRESS NOTE
Assessment and Plan





ssessment: 


1) SIRS: Improving  Etiology unclear. DDX:, influenza, proctitis/rectal abscess/

STD.. Doubt opportunistic infection.


               - Rapid Strep test negative


                - Influenza -negative


                - Strep culture - pending


               -RPR- negative


                - Blood culture - ngtd


                -Urine culture - ngtd


                 -Chest xray - neg


 2) HIV infection: since 2013, last CO4=719, VL undetectable in June 2018, his 

HIV provider is in [place]. Takes complera. He moved to [place] 1 y and 1/2 ago 

but he has not established HIV care here. He went to [place] in June and saw 

his PCP


              -CRP - 6.1. 


3) Rectal pain/bleeding: DDx: proctitis/rectal trauma infected/abscess/STD (HSV/

syphilis/HSV/Chlamydia/LGV). He is MSM. He reported to one of the providers 

symptoms developed after anal intercourse last week, however he reported to me 

that last anal intercouse was 2 months ago. Of note, last colonoscopy was in 

[place] in 2017 which revealed a possible abscess that required draining per 

patient.  


   -Occult blood positive. 


   -CT showed appendix mildly dilated, rectum had normal appearance


             - The patient had a colonoscopy in 2017. Per GI, "no strong 

clinical suspicion of abscess, but likely has minor fissure or trauma".  

Endoscopic study not done in light of resolving symptoms


   -Pelvic MRI Prostate gland Normal. Seminal vesicles Normal. Multiple 

nonenlarged lymph nodes are noted in bilateral superficial inguinal regions. No 

evidence of abscess formation 





Plan:


-follow up  on  HSV serology


-continue ceftriaxone D3


-continue flagyl D4


-continue doxycyclineD4


-stop valtrex


-continue complera


-Upon discharge will treat with Doxycycline 100 mg po, 2x a day for a total of 

21 days ending 10-1-18 and Augmentin 875 mg, po every 12 hours for a total of 

14 days ending 9-25-18 for proctitis. Prescriptions on chart.   Will follow up 

in the office 10-4-18.





Will sign off 








Subjective


Principal diagnosis: minor rectal bleeding and anorectal pain


Interval history: 


Patient was sitting up in bed. Patient stated that he was better today and 

anxious to go home.





Blood cultures:


9/10 ngtd





Urine cultures:


9/10 neg





 


Current Antimicrobials:


ceftriaxone 9/12


Doxycyline 9/11


flagyl - 9/11





Previous Antimicrobials:








Objective





- Exam


Narrative Exam: 





eneral appearance: Alert in NAD, conversant 


Eyes: anicteric sclerae, moist conjunctivae; no lid-lag; PERRLA 


HENT: Atraumatic; oropharynx + erthematous edematous tonsils and no mucosal 

ulcerations/no oral thrush; normal hard and soft palate. Normal external ears.


Neck: Trachea midline; supple, no thyromegaly or lymphadenopathy 


Lungs: CTA, with normal respiratory effort and no intercostal retractions 


CV: RRR, no murmurs


Abdomen: Soft, non-tender; no masses or hepatosplenomegaly


Extremities: No peripheral edema or extremity lymphadenopathy


Skin: Normal temperature, turgor and texture; no rash, ulcers or subcutaneous 

nodules 


Psych: Appropriate affect, alert and oriented to person, place and time. Neuro: 

alert and oriented x 3. Moving all extermities


Lines: No CVL / PICC








- Constitutional


Vitals: 


 Vital Signs











Temp Pulse Resp BP Pulse Ox


 


 98.2 F   74   18   101/67   99 


 


 09/14/18 05:27  09/14/18 05:27  09/14/18 05:27  09/14/18 05:27  09/14/18 05:27








 Temperature -Last 24 Hours











Temperature                    98.2 F


 


Temperature                    97.5 F


 


Temperature                    98.7 F


 


Temperature                    98.5 F

















- Labs


CBC & Chem 7: 


 09/13/18 10:07





 09/11/18 02:31


Labs: 


 Abnormal lab results











  09/13/18 Range/Units





  10:07 


 


Hct  34.9 L  (35.5-45.6)  %